# Patient Record
Sex: FEMALE | Race: WHITE | NOT HISPANIC OR LATINO | Employment: FULL TIME | URBAN - METROPOLITAN AREA
[De-identification: names, ages, dates, MRNs, and addresses within clinical notes are randomized per-mention and may not be internally consistent; named-entity substitution may affect disease eponyms.]

---

## 2019-07-02 ENCOUNTER — OFFICE VISIT (OUTPATIENT)
Dept: FAMILY MEDICINE | Facility: CLINIC | Age: 54
End: 2019-07-02
Payer: COMMERCIAL

## 2019-07-02 VITALS
BODY MASS INDEX: 30.46 KG/M2 | OXYGEN SATURATION: 98 % | DIASTOLIC BLOOD PRESSURE: 79 MMHG | WEIGHT: 201 LBS | RESPIRATION RATE: 16 BRPM | HEIGHT: 68 IN | TEMPERATURE: 98 F | SYSTOLIC BLOOD PRESSURE: 117 MMHG | HEART RATE: 77 BPM

## 2019-07-02 DIAGNOSIS — E11.49 DIABETES MELLITUS TYPE 2 WITH NEUROLOGICAL MANIFESTATIONS: ICD-10-CM

## 2019-07-02 DIAGNOSIS — R20.0 NUMBNESS OF TOES: ICD-10-CM

## 2019-07-02 DIAGNOSIS — R09.89 ABSENT PEDAL PULSES: Primary | ICD-10-CM

## 2019-07-02 DIAGNOSIS — E78.5 HYPERLIPIDEMIA, UNSPECIFIED HYPERLIPIDEMIA TYPE: ICD-10-CM

## 2019-07-02 DIAGNOSIS — E11.49 DIABETES MELLITUS TYPE 2 WITH NEUROLOGICAL MANIFESTATIONS: Primary | ICD-10-CM

## 2019-07-02 DIAGNOSIS — K59.09 CHRONIC CONSTIPATION: ICD-10-CM

## 2019-07-02 DIAGNOSIS — Z00.00 ANNUAL PHYSICAL EXAM: ICD-10-CM

## 2019-07-02 DIAGNOSIS — Z12.39 SCREENING FOR MALIGNANT NEOPLASM OF BREAST: ICD-10-CM

## 2019-07-02 PROCEDURE — 99396 PREV VISIT EST AGE 40-64: CPT | Mod: S$GLB,,, | Performed by: NURSE PRACTITIONER

## 2019-07-02 PROCEDURE — 99396 PR PREVENTIVE VISIT,EST,40-64: ICD-10-PCS | Mod: S$GLB,,, | Performed by: NURSE PRACTITIONER

## 2019-07-02 RX ORDER — ATORVASTATIN CALCIUM 40 MG/1
40 TABLET, FILM COATED ORAL DAILY
Qty: 30 TABLET | Refills: 3 | Status: SHIPPED | OUTPATIENT
Start: 2019-07-02 | End: 2019-08-20 | Stop reason: SDUPTHER

## 2019-07-02 RX ORDER — DAPAGLIFLOZIN 5 MG/1
5 TABLET, FILM COATED ORAL DAILY
Qty: 30 TABLET | Refills: 3 | Status: SHIPPED | OUTPATIENT
Start: 2019-07-02 | End: 2019-08-20 | Stop reason: SDUPTHER

## 2019-07-02 NOTE — PROGRESS NOTES
Will establish statin tolerability then increase to 80 mg if tolerates    Notified pt to stay clean and dry vaginally on farxiga - pt verb understanding    Notified pt of all her lab work- she verb u/s    Pt request needleless system to check sugars will send rx for freestyle sadiq    Instructed her to keep log of fasting sugarrs as well as 2 hours after a meal when possible

## 2019-07-02 NOTE — PROGRESS NOTES
Subjective:       Patient ID: Margaret Pate is a 54 y.o. female.    Chief Complaint: Establish Care (Prior PCP Dr Barrios. Fasting this AM. Is DM2 and was on Metformin, but stayed nauseoues. 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 Negative for fever and unexpected weight change.   HENT: Positive for congestion, postnasal drip, rhinorrhea and sneezing.    Eyes: Positive for itching.   Respiratory: Negative for cough, shortness of breath and wheezing.    Cardiovascular: Negative for chest pain, palpitations and leg swelling.   Gastrointestinal: Positive for constipation. Negative for abdominal pain, diarrhea, nausea and vomiting.   Endocrine: Positive for polydipsia. Negative for cold intolerance, heat intolerance, polyphagia and polyuria.   Genitourinary: Negative.    Musculoskeletal: Negative.    Skin: Positive for wound (scabbed lesions to toes).   Allergic/Immunologic: Positive for environmental allergies.   Neurological: Positive for numbness (bilateral feet) and headaches (twice weekly, frontal, relieved with ibuprofen). Negative for dizziness, seizures and syncope.   Psychiatric/Behavioral: Positive for sleep disturbance (4-8 hours, thinks alot, trouble falling asleep).  "Negative for dysphoric mood. The patient is not nervous/anxious.        Objective:      Physical Exam   Constitutional: She is oriented to person, place, and time. She appears well-developed and well-nourished.   HENT:   Head: Normocephalic.   Right Ear: External ear normal.   Left Ear: External ear normal.   Eyes: Right eye exhibits no discharge. Left eye exhibits no discharge.   Neck: Neck supple. No tracheal deviation present. No thyromegaly present.   Cardiovascular: Normal rate, regular rhythm and normal heart sounds.   No murmur heard.  Pulses:       Dorsalis pedis pulses are 2+ on the right side, and 2+ on the left side.        Posterior tibial pulses are 0 on the right side, and 0 on the left side.   Pulmonary/Chest: Effort normal and breath sounds normal. She has no wheezes. She has no rales.   Abdominal: Soft. Bowel sounds are normal. She exhibits mass (left upper quad- states "port from lapband sugery"). She exhibits no distension. There is no tenderness. There is no guarding.   Musculoskeletal: Normal range of motion. She exhibits no edema.   Neurological: She is alert and oriented to person, place, and time.   Skin: Skin is warm and dry. Lesion noted. There is erythema.   Right and left great toe with scab approx 0.5 cm by 0.5 cm, no drainage, erythema around scab   Psychiatric: She has a normal mood and affect. Her behavior is normal. Judgment normal.       Assessment:       No diagnosis found.    Plan:       1. Absent pedal pulses  VAS US Arterial Legs Bilateral    VAS US Arterial Legs Bilateral    declines cardio ref   2. Annual physical exam  CBC auto differential    TSH    Comprehensive metabolic panel    Lipid panel    Hemoglobin A1c    Microalbumin/creatinine urine ratio    CBC auto differential    TSH    Comprehensive metabolic panel    Lipid panel    Hemoglobin A1c   3. Screening for malignant neoplasm of breast  Mammo Digital Screening Bilat    Mammo Digital Screening Bilat   4. Chronic " constipation  linaCLOtide (LINZESS) 145 mcg Cap capsule   5. Diabetes mellitus type 2 with neurological manifestations     6. Numbness of toes      pt declines recommended nerve conduction study test

## 2019-07-03 ENCOUNTER — TELEPHONE (OUTPATIENT)
Dept: FAMILY MEDICINE | Facility: CLINIC | Age: 54
End: 2019-07-03

## 2019-07-03 DIAGNOSIS — E11.49 DIABETES MELLITUS TYPE 2 WITH NEUROLOGICAL MANIFESTATIONS: Primary | ICD-10-CM

## 2019-07-03 RX ORDER — LANCETS
EACH MISCELLANEOUS
Qty: 30 EACH | Refills: 6 | Status: SHIPPED | OUTPATIENT
Start: 2019-07-03 | End: 2023-06-29

## 2019-07-03 RX ORDER — INSULIN PUMP SYRINGE, 3 ML
EACH MISCELLANEOUS
Qty: 1 EACH | Refills: 0 | Status: SHIPPED | OUTPATIENT
Start: 2019-07-03 | End: 2021-07-09

## 2019-07-03 NOTE — TELEPHONE ENCOUNTER
Pt states the flash glucose kit copay is too expensive ( $20 q 2wks) - Wants a regular glucose monitor sent to [facility] and they will run it through her insur.

## 2019-07-08 ENCOUNTER — TELEPHONE (OUTPATIENT)
Dept: FAMILY MEDICINE | Facility: CLINIC | Age: 54
End: 2019-07-08

## 2019-07-08 NOTE — TELEPHONE ENCOUNTER
Since on Januvia, farxiga, atrovastatin having horrible unbearable headaches behind both eyes and mid forehead all day long Tues-SUN - Tried IBU, bc powder, tylenol, essential oil.     Hasn't started Linzess yet.   Read that Januvia can cause headaches. Pls advise.

## 2019-07-08 NOTE — TELEPHONE ENCOUNTER
"Spoke w/ NP BEA Mejia and he stated for pt to d/c Farxiga and Januvia until pt f/u w/ Marlene in ofc. Go to ER if worsens.    Pt returned our call. When notifying pt of what NP BEA Mejia response pt stated " Well I haven't taken my medications today so that may hurt me. I am not happy about this, I've waited on a call back all day. I don't want an appt, but want a call back from Marlene tomorrow"    During initial call I explained to her that "Marlene isn't in today, but will be in Tuesday & Wedn and we will get back with her once I get a response. Stop taking the new meds & go to ER if worsens."  "

## 2019-07-09 ENCOUNTER — TELEPHONE (OUTPATIENT)
Dept: INTERNAL MEDICINE | Facility: CLINIC | Age: 54
End: 2019-07-09

## 2019-07-09 NOTE — TELEPHONE ENCOUNTER
Pt states stopped her 3 new medications- farxiga, januvia, and atorvastatin Sunday night due to a headache behind her eyes, she never picked up linzess due to cost at this time    Pt states no head ache at this time, I told her to start back on farxiga today and stay only on this medication until follow up appt and we can add one medication at a time to ensure tolerability - pt verb u/s

## 2019-07-14 PROBLEM — E03.9 HYPOTHYROIDISM: Status: RESOLVED | Noted: 2019-07-14 | Resolved: 2019-07-14

## 2019-07-14 PROBLEM — E03.9 HYPOTHYROIDISM: Status: ACTIVE | Noted: 2019-07-14

## 2019-07-14 PROBLEM — E78.5 HYPERLIPIDEMIA: Status: ACTIVE | Noted: 2019-07-14

## 2019-07-16 ENCOUNTER — TELEPHONE (OUTPATIENT)
Dept: FAMILY MEDICINE | Facility: CLINIC | Age: 54
End: 2019-07-16

## 2019-07-16 DIAGNOSIS — R07.9 CHEST PAIN OF UNKNOWN ETIOLOGY: Primary | ICD-10-CM

## 2019-07-16 DIAGNOSIS — R09.89 ABSENT PEDAL PULSES: ICD-10-CM

## 2019-07-16 DIAGNOSIS — E78.5 HYPERLIPIDEMIA, UNSPECIFIED HYPERLIPIDEMIA TYPE: ICD-10-CM

## 2019-07-16 DIAGNOSIS — E11.49 DIABETES MELLITUS TYPE 2 WITH NEUROLOGICAL MANIFESTATIONS: ICD-10-CM

## 2019-07-16 NOTE — TELEPHONE ENCOUNTER
"1320 - patient called stating she would like referral to cardiologist for perceived chest pain. She was instructed to go to the emergency room for chest pain by M.A. She declined at the time stating it was not that bad but she insisted she get into Cardiology today.  She was informed that there was no guarantee the cardiology would see her today.      Notified of above by Constance Batxer.     Instructed to call pt back and inform referral sent to Dr. Jon and that we can not get her in any quicker. Advised the need to go to ER regardless of when she gets scheduled with cardiology given her risk factors. Informed to go to ER if chest pain worsens.     Noted in chart from prior NP on 7/2:     "Pt declines nerve conduction test at this time, pt declines amb referral to gastroenterology, pt declines amb ref to cardiology regarding feet pain, no hair growth, and states feet get cold."  "

## 2019-07-16 NOTE — TELEPHONE ENCOUNTER
Pt notified - tried giving Dr Jon's ph #, but pt states she is unable to write it down and will goggle him.

## 2019-07-29 ENCOUNTER — OFFICE VISIT (OUTPATIENT)
Dept: FAMILY MEDICINE | Facility: CLINIC | Age: 54
End: 2019-07-29
Payer: COMMERCIAL

## 2019-07-29 VITALS
WEIGHT: 179 LBS | DIASTOLIC BLOOD PRESSURE: 65 MMHG | HEIGHT: 68 IN | BODY MASS INDEX: 27.13 KG/M2 | RESPIRATION RATE: 18 BRPM | SYSTOLIC BLOOD PRESSURE: 135 MMHG | HEART RATE: 72 BPM | OXYGEN SATURATION: 100 %

## 2019-07-29 DIAGNOSIS — G25.81 RESTLESS LEGS: ICD-10-CM

## 2019-07-29 DIAGNOSIS — D75.1 ERYTHROCYTOSIS: ICD-10-CM

## 2019-07-29 DIAGNOSIS — R79.9 ABNORMAL SERUM TOTAL PROTEIN LEVEL: ICD-10-CM

## 2019-07-29 DIAGNOSIS — E78.5 HYPERLIPIDEMIA, UNSPECIFIED HYPERLIPIDEMIA TYPE: ICD-10-CM

## 2019-07-29 DIAGNOSIS — E11.49 DIABETES MELLITUS TYPE 2 WITH NEUROLOGICAL MANIFESTATIONS: Primary | ICD-10-CM

## 2019-07-29 DIAGNOSIS — G47.30 SLEEP APNEA, UNSPECIFIED TYPE: ICD-10-CM

## 2019-07-29 DIAGNOSIS — E11.42 DIABETIC POLYNEUROPATHY ASSOCIATED WITH TYPE 2 DIABETES MELLITUS: ICD-10-CM

## 2019-07-29 PROCEDURE — 3008F BODY MASS INDEX DOCD: CPT | Mod: CPTII,S$GLB,, | Performed by: NURSE PRACTITIONER

## 2019-07-29 PROCEDURE — 99215 OFFICE O/P EST HI 40 MIN: CPT | Mod: S$GLB,,, | Performed by: NURSE PRACTITIONER

## 2019-07-29 PROCEDURE — 3008F PR BODY MASS INDEX (BMI) DOCUMENTED: ICD-10-PCS | Mod: CPTII,S$GLB,, | Performed by: NURSE PRACTITIONER

## 2019-07-29 PROCEDURE — 99215 PR OFFICE/OUTPT VISIT, EST, LEVL V, 40-54 MIN: ICD-10-PCS | Mod: S$GLB,,, | Performed by: NURSE PRACTITIONER

## 2019-07-29 RX ORDER — GABAPENTIN 300 MG/1
300 CAPSULE ORAL NIGHTLY
Qty: 30 CAPSULE | Refills: 11 | Status: SHIPPED | OUTPATIENT
Start: 2019-07-29 | End: 2019-08-20 | Stop reason: SDUPTHER

## 2019-07-29 NOTE — ASSESSMENT & PLAN NOTE
Continue Farxiga.  Made aware of increased risk UTI and dehydration.      Repeat A1C in 3 mts.  Can tolerate glimepiride and pioglitazone.      Pt well aware of dieting.

## 2019-07-29 NOTE — ASSESSMENT & PLAN NOTE
She will trial Atorvastatin again.  She will notify our office if headaches come back with introduction of medication. Repeat lipid in 6 mts.

## 2019-07-29 NOTE — PROGRESS NOTES
Subjective:      Patient ID: Margaret Pate is a 54 y.o. female.    Chief Complaint: Follow-up (2WK dm2, chest pain, feet pain.)      Establish Care (Prior PCP Dr Barrios. Fasting this AM. Is DM2 and was on Metformin, but stayed nauseoues. 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  During her last encounter, the patient was started on Farxiga and Januvia.  Also was prescribed atorvastatin.  Patient reported headache with the start of all 3 medications.  Patient states she is on Farxiga currently, which controls her blood sugar.  She is checking every morning. Her sugars typically run 115-130s on Farxiga alone. No side effects. She has not tried to get back on Januvia.     Patient states that she was recently seen at an urgent care for atypical chest pain. States her EKG was normal at the time.  She was referred to Dr. Marco Guevara. GXT was normal. She was given samples of Livalo but she is unable to afford the medication. She has Atorvastatin at home, but has not tried to get back on the medication. Denies myalgia while on the med. Tolerating Livalo well, but almost out of the medication. She reports resolution of CP.     She is scheduled for US Arterial  "extremities for "poor circulation."  She has not completed the testing d/t financial issues. Denies claudication. She does report neuropathy of the feet. This is chronic and worse while in bed at night. Denies ulcerations. She does have numbness primarily in the toes. Was offered EMG but denied.       No acute issues reported.       ROS:   Review of Systems   Constitutional: Positive for fatigue. Negative for fever and unexpected weight change.   HENT: Negative for congestion, postnasal drip, rhinorrhea and sneezing.    Eyes: Negative for photophobia, discharge, itching and visual disturbance.   Respiratory: Negative for cough, shortness of breath and wheezing.    Cardiovascular: Negative for chest pain, palpitations and leg swelling.   Gastrointestinal: Positive for constipation. Negative for abdominal pain, diarrhea, nausea and vomiting.   Endocrine: Positive for polydipsia. Negative for cold intolerance, heat intolerance, polyphagia and polyuria.   Genitourinary: Negative.    Musculoskeletal: Negative.    Skin: Positive for wound (scabbed lesions to toes).   Allergic/Immunologic: Positive for environmental allergies.   Neurological: Positive for numbness (bilateral feet). Negative for dizziness, seizures, syncope and headaches (twice weekly, frontal, relieved with ibuprofen).   Psychiatric/Behavioral: Positive for sleep disturbance (4-8 hours, thinks alot, trouble falling asleep). Negative for dysphoric mood. The patient is not nervous/anxious.      Objective:   Physical Exam   Constitutional: She is oriented to person, place, and time. She appears well-developed and well-nourished.   HENT:   Head: Normocephalic.   Right Ear: External ear normal.   Left Ear: External ear normal.   Eyes: Right eye exhibits no discharge. Left eye exhibits no discharge.   Neck: Neck supple. No tracheal deviation present. No thyromegaly present.   Cardiovascular: Normal rate, regular rhythm and normal heart sounds.   No murmur " "heard.  Pulses:       Dorsalis pedis pulses are 2+ on the right side, and 2+ on the left side.        Posterior tibial pulses are 0 on the right side, and 0 on the left side.   Pulmonary/Chest: Effort normal and breath sounds normal. She has no wheezes. She has no rales.   Abdominal: Soft. Bowel sounds are normal. She exhibits mass (left upper quad- states "port from lapband sugery"). She exhibits no distension. There is no tenderness. There is no guarding.   Musculoskeletal: Normal range of motion. She exhibits no edema.   Neurological: She is alert and oriented to person, place, and time.   Skin: Skin is warm and dry. Lesion noted. There is erythema.   Right and left great toe with scab approx 0.5 cm by 0.5 cm, no drainage, erythema around scab.  Cap refill <2. Nonpainful.    Psychiatric: She has a normal mood and affect. Her speech is normal and behavior is normal. Judgment and thought content normal. Cognition and memory are normal.     Assessment:     1. Diabetes mellitus type 2 with neurological manifestations    2. Hyperlipidemia, unspecified hyperlipidemia type    3. Sleep apnea, unspecified type    4. Erythrocytosis    5. Diabetic polyneuropathy associated with type 2 diabetes mellitus    6. Restless legs    7. Abnormal serum total protein level      Plan:     Problem List Items Addressed This Visit        Neuro    Restless legs    Current Assessment & Plan     Check Mg         Relevant Medications    gabapentin (NEURONTIN) 300 MG capsule    Other Relevant Orders    Magnesium    CBC auto differential    Comprehensive metabolic panel    Hemoglobin A1c    Protein electrophoresis, serum    Microalbumin/creatinine urine ratio    Erythropoietin    Diabetic polyneuropathy associated with type 2 diabetes mellitus    Current Assessment & Plan     Trial Gabapentin at night.          Relevant Medications    gabapentin (NEURONTIN) 300 MG capsule    Other Relevant Orders    Magnesium    CBC auto differential    " Comprehensive metabolic panel    Hemoglobin A1c    Protein electrophoresis, serum    Microalbumin/creatinine urine ratio    Erythropoietin       Cardiac/Vascular    Hyperlipidemia    Overview     Unable to tolerate Atorvastin 2/t headache.      Bile acid sequestrant not an option 2/t constipation.         Current Assessment & Plan     She will trial Atorvastatin again.  She will notify our office if headaches come back with introduction of medication. Repeat lipid in 6 mts.          Relevant Medications    gabapentin (NEURONTIN) 300 MG capsule    Other Relevant Orders    Magnesium    CBC auto differential    Comprehensive metabolic panel    Hemoglobin A1c    Protein electrophoresis, serum    Microalbumin/creatinine urine ratio    Erythropoietin       Oncology    Erythrocytosis    Overview     2/t IRVIN.          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          Current Assessment & Plan     Continue Farxiga.  Made aware of increased risk UTI and dehydration.      Repeat A1C in 3 mts.  Can tolerate glimepiride and pioglitazone.      Pt well aware of dieting.          Relevant Medications    gabapentin (NEURONTIN) 300 MG capsule    Other Relevant Orders    Magnesium    CBC auto differential    Comprehensive metabolic panel    Hemoglobin A1c    Protein electrophoresis, serum    Microalbumin/creatinine urine ratio    Erythropoietin    Protein electrophoresis, serum       Other    Sleep apnea    Overview     Erythrocytosis noted.          Relevant Medications    gabapentin (NEURONTIN) 300 MG capsule    Other Relevant Orders     Magnesium    CBC auto differential    Comprehensive metabolic panel    Hemoglobin A1c    Protein electrophoresis, serum    Microalbumin/creatinine urine ratio    Erythropoietin    Abnormal serum total protein level    Overview     Will draw SPEP. Order HIV/Hep panel at next visit if TP comes back elevated again.          Relevant Medications    gabapentin (NEURONTIN) 300 MG capsule    Other Relevant Orders    Magnesium    CBC auto differential    Comprehensive metabolic panel    Hemoglobin A1c    Protein electrophoresis, serum    Microalbumin/creatinine urine ratio    Erythropoietin    Protein electrophoresis, serum        Labs discussed thoroughly with patient.  All questions were answered to her liking.  Suggested to the patient that she complete the ultrasound arterial extremity as ordered.

## 2019-08-20 ENCOUNTER — TELEPHONE (OUTPATIENT)
Dept: FAMILY MEDICINE | Facility: CLINIC | Age: 54
End: 2019-08-20

## 2019-08-20 DIAGNOSIS — K59.09 CHRONIC CONSTIPATION: ICD-10-CM

## 2019-08-20 DIAGNOSIS — E11.49 DIABETES MELLITUS TYPE 2 WITH NEUROLOGICAL MANIFESTATIONS: ICD-10-CM

## 2019-08-20 DIAGNOSIS — D75.1 ERYTHROCYTOSIS: ICD-10-CM

## 2019-08-20 DIAGNOSIS — G47.30 SLEEP APNEA, UNSPECIFIED TYPE: ICD-10-CM

## 2019-08-20 DIAGNOSIS — E11.42 DIABETIC POLYNEUROPATHY ASSOCIATED WITH TYPE 2 DIABETES MELLITUS: ICD-10-CM

## 2019-08-20 DIAGNOSIS — G25.81 RESTLESS LEGS: ICD-10-CM

## 2019-08-20 DIAGNOSIS — E78.5 HYPERLIPIDEMIA, UNSPECIFIED HYPERLIPIDEMIA TYPE: ICD-10-CM

## 2019-08-20 DIAGNOSIS — R79.9 ABNORMAL SERUM TOTAL PROTEIN LEVEL: ICD-10-CM

## 2019-08-20 RX ORDER — DAPAGLIFLOZIN 5 MG/1
5 TABLET, FILM COATED ORAL DAILY
Qty: 90 TABLET | Refills: 2 | Status: SHIPPED | OUTPATIENT
Start: 2019-08-20 | End: 2020-01-24

## 2019-08-20 RX ORDER — GABAPENTIN 300 MG/1
300 CAPSULE ORAL NIGHTLY
Qty: 90 CAPSULE | Refills: 2 | Status: SHIPPED | OUTPATIENT
Start: 2019-08-20 | End: 2020-01-24 | Stop reason: SDUPTHER

## 2019-08-20 RX ORDER — ATORVASTATIN CALCIUM 40 MG/1
40 TABLET, FILM COATED ORAL DAILY
Qty: 90 TABLET | Refills: 1 | Status: SHIPPED | OUTPATIENT
Start: 2019-08-20 | End: 2020-01-24 | Stop reason: SDUPTHER

## 2019-08-20 NOTE — TELEPHONE ENCOUNTER
----- Message from Marisol Rome sent at 8/20/2019  9:22 AM CDT -----  Contact: self  Pt called to get medication called out to [facility] to receive discount. Pt said she will need a 3 month supply of :         atorvastatin (LIPITOR) 40 MG tablet(Take 1 tablet (40 mg total) by mouth once daily. - Oral)        dapagliflozin (FARXIGA) 5 mg Tab tablet (Take 1 tablet (5 mg total) by mouth once daily. -     Oral)        abapentin (NEURONTIN) 300 MG capsule( Take 1 capsule (300 mg total) by mouth every evening. - Oral),     linaCLOtide (LINZESS) 145 mcg Cap capsule(Take 1 capsule (145 mcg total) by mouth once daily. - Oral)

## 2019-09-04 ENCOUNTER — TELEPHONE (OUTPATIENT)
Dept: FAMILY MEDICINE | Facility: CLINIC | Age: 54
End: 2019-09-04

## 2019-09-04 NOTE — TELEPHONE ENCOUNTER
Pt states she would like to d/c Farxiga and start Actos. Has taken Actos in the past and is now unable to afford high Farxiga copay.

## 2019-09-05 RX ORDER — PIOGLITAZONEHYDROCHLORIDE 30 MG/1
30 TABLET ORAL DAILY
Qty: 90 TABLET | Refills: 3 | Status: SHIPPED | OUTPATIENT
Start: 2019-09-05 | End: 2019-10-18 | Stop reason: SDUPTHER

## 2019-10-18 ENCOUNTER — TELEPHONE (OUTPATIENT)
Dept: FAMILY MEDICINE | Facility: CLINIC | Age: 54
End: 2019-10-18

## 2019-10-18 RX ORDER — PIOGLITAZONEHYDROCHLORIDE 30 MG/1
30 TABLET ORAL DAILY
Qty: 90 TABLET | Refills: 3 | Status: SHIPPED | OUTPATIENT
Start: 2019-10-18 | End: 2020-01-24 | Stop reason: SDUPTHER

## 2019-10-25 ENCOUNTER — CLINICAL SUPPORT (OUTPATIENT)
Dept: FAMILY MEDICINE | Facility: CLINIC | Age: 54
End: 2019-10-25
Payer: COMMERCIAL

## 2020-01-24 ENCOUNTER — OFFICE VISIT (OUTPATIENT)
Dept: FAMILY MEDICINE | Facility: CLINIC | Age: 55
End: 2020-01-24
Payer: COMMERCIAL

## 2020-01-24 VITALS
DIASTOLIC BLOOD PRESSURE: 80 MMHG | WEIGHT: 192 LBS | RESPIRATION RATE: 16 BRPM | OXYGEN SATURATION: 98 % | SYSTOLIC BLOOD PRESSURE: 132 MMHG | HEART RATE: 82 BPM | BODY MASS INDEX: 29.1 KG/M2 | HEIGHT: 68 IN

## 2020-01-24 DIAGNOSIS — E11.49 DIABETES MELLITUS TYPE 2 WITH NEUROLOGICAL MANIFESTATIONS: ICD-10-CM

## 2020-01-24 DIAGNOSIS — R79.9 ABNORMAL SERUM TOTAL PROTEIN LEVEL: ICD-10-CM

## 2020-01-24 DIAGNOSIS — D75.1 ERYTHROCYTOSIS: ICD-10-CM

## 2020-01-24 DIAGNOSIS — Z09 HOSPITAL DISCHARGE FOLLOW-UP: Primary | ICD-10-CM

## 2020-01-24 DIAGNOSIS — G25.81 RESTLESS LEGS: ICD-10-CM

## 2020-01-24 DIAGNOSIS — E78.5 HYPERLIPIDEMIA, UNSPECIFIED HYPERLIPIDEMIA TYPE: ICD-10-CM

## 2020-01-24 DIAGNOSIS — G47.30 SLEEP APNEA, UNSPECIFIED TYPE: ICD-10-CM

## 2020-01-24 DIAGNOSIS — B37.49 CANDIDAL URINARY TRACT INFECTION: ICD-10-CM

## 2020-01-24 DIAGNOSIS — E11.42 DIABETIC POLYNEUROPATHY ASSOCIATED WITH TYPE 2 DIABETES MELLITUS: ICD-10-CM

## 2020-01-24 DIAGNOSIS — N39.0 URINARY TRACT INFECTION WITHOUT HEMATURIA, SITE UNSPECIFIED: ICD-10-CM

## 2020-01-24 PROCEDURE — 99215 PR OFFICE/OUTPT VISIT, EST, LEVL V, 40-54 MIN: ICD-10-PCS | Mod: S$GLB,,, | Performed by: NURSE PRACTITIONER

## 2020-01-24 PROCEDURE — 99215 OFFICE O/P EST HI 40 MIN: CPT | Mod: S$GLB,,, | Performed by: NURSE PRACTITIONER

## 2020-01-24 PROCEDURE — 3008F BODY MASS INDEX DOCD: CPT | Mod: CPTII,S$GLB,, | Performed by: NURSE PRACTITIONER

## 2020-01-24 PROCEDURE — 3008F PR BODY MASS INDEX (BMI) DOCUMENTED: ICD-10-PCS | Mod: CPTII,S$GLB,, | Performed by: NURSE PRACTITIONER

## 2020-01-24 RX ORDER — GABAPENTIN 300 MG/1
300 CAPSULE ORAL NIGHTLY
Qty: 90 CAPSULE | Refills: 2 | Status: SHIPPED | OUTPATIENT
Start: 2020-01-24 | End: 2020-10-14 | Stop reason: SDUPTHER

## 2020-01-24 RX ORDER — ATORVASTATIN CALCIUM 40 MG/1
40 TABLET, FILM COATED ORAL DAILY
Qty: 90 TABLET | Refills: 1 | Status: SHIPPED | OUTPATIENT
Start: 2020-01-24 | End: 2020-10-14 | Stop reason: SDUPTHER

## 2020-01-24 RX ORDER — POLYETHYLENE GLYCOL 3350 17 G/17G
17 POWDER, FOR SOLUTION ORAL DAILY
Qty: 100 EACH | Refills: 2 | Status: SHIPPED | OUTPATIENT
Start: 2020-01-24 | End: 2021-08-10

## 2020-01-24 RX ORDER — NITROFURANTOIN 25; 75 MG/1; MG/1
CAPSULE ORAL
COMMUNITY
Start: 2020-01-21 | End: 2021-07-09

## 2020-01-24 RX ORDER — GLIMEPIRIDE 2 MG/1
2 TABLET ORAL 2 TIMES DAILY WITH MEALS
Qty: 180 TABLET | Refills: 3 | Status: SHIPPED | OUTPATIENT
Start: 2020-01-24 | End: 2020-01-31 | Stop reason: SDUPTHER

## 2020-01-24 RX ORDER — PIOGLITAZONEHYDROCHLORIDE 30 MG/1
30 TABLET ORAL DAILY
Qty: 90 TABLET | Refills: 3 | Status: SHIPPED | OUTPATIENT
Start: 2020-01-24 | End: 2020-02-26

## 2020-01-24 NOTE — ASSESSMENT & PLAN NOTE
Patient currently on Actos 30 mg.     will start glimepiride 2 mg b.i.d.   She will get A1c on Monday morning  And follow up on Thursday or Friday to discuss results.      patient states she was unable to tolerate Januvia and Farxiga.    another option would be Jardiance.   
 Total protein 8.9  Per ER records.      order SPEP.        will determine further action once the results come back.  Likely will order HIV/ hepatitis panel  
 Will Rx atorvastatin.  Doubtful that and her headache was secondary to this medication.   
Plan to order EPO.   
100% of the time

## 2020-01-24 NOTE — PATIENT INSTRUCTIONS
Healthy Meals for Diabetes     A healthcare provider will help you develop a meal plan that fits your needs.   Ask your healthcare team to help you make a meal plan that fits your needs. Your meal plan tells you when to eat your meals and snacks, what kinds of foods to eat, and how much of each food to eat. You dont have to give up all the foods you like. But you do need to follow some guidelines.  Choose healthy carbohydrates  Starches, sugars, and fiber are all types of carbohydrates. Fiber can help lower your cholesterol and triglycerides. Fiber is also healthy for your heart. You should have 20 to 35 grams of total fiber each day. Fiber-rich foods include:  · Whole-grain breads and cereals  · Bulgur wheat  · Brown rice     · Whole-wheat pasta  · Fruits and vegetables  · Dry beans, and peas   Keep track of the amount of carbohydrates you eat. This can help you keep the right balance of physical activity and medicine. The amount of carbohydrates needed will vary for each person. It depends on many things such as your health, the medicines you take, and how active you are. Your healthcare team will help you figure out the right amount of carbohydrates for you. You may start with around 45 to 60 grams of carbohydrates per meal, depending on your situation.   Here are some examples of foods containing about 15 grams of carbohydrates (1 serving of carbohydrates):  · 1/2 cup of canned or frozen fruit  · A small piece of fresh fruit (4 ounces)  · 1 slice of bread  · 1/2 cup of oatmeal  · 1/3 cup of rice  · 4 to 6 crackers  · 1/2 English muffin  · 1/2 cup of black beans  · 1/4 of a large baked potato (3 ounces)  · 2/3 cup of plain fat-free yogurt  · 1 cup of soup  · 1/2 cup of casserole  · 6 chicken nuggets  · 2-inch-square brownie or cake without frosting  · 2 small cookies  · 1/2 cup of ice cream or sherbet  Choose healthy protein foods  Eating protein that is low in fat can help you control your weight. It also  helps keep your heart healthy. Low-fat protein foods include:  · Fish  · Plant proteins, such as dry beans and peas, nuts, and soy products like tofu and soymilk  · Lean meat with all visible fat removed  · Poultry with the skin removed  · Low-fat or nonfat milk, cheese, and yogurt  Limit unhealthy fats and sugar  Saturated and trans fats are unhealthy for your heart. They raise LDL (bad) cholesterol. Fat is also high in calories, so it can make you gain weight. To cut down on unhealthy fats and sugar, limit these foods:  · Butter or margarine  · Palm and palm kernel oils and coconut oil  · Cream  · Cheese  · Cottrell  · Lunch meats     · Ice cream  · Sweet bakery goods such as pies, muffins, and donuts  · Jams and jellies  · Candy bars  · Regular sodas   How much to eat  The amount of food you eat affects your blood sugar. It also affects your weight. Your healthcare team will tell you how much of each type of food you should eat.  · Use measuring cups and spoons and a food scale to measure serving sizes.  · Learn what a correct serving size looks like on your plate. This will help when you are away from home and cant measure your servings.  · Eat only the number of servings given on your meal plan for each food. Dont take seconds.  · Learn to read food labels. Be sure to look at serving size, total carbohydrates, fiber, calories, sugar, and saturated and trans fats. Look for healthier alternatives to foods that have added sugar.  · Plan ahead for parties so you can still have a good time without going overboard with unhealthy food choices. Set a good example yourself by bringing a healthy dish to pot lucks.   Choose healthy snacks  When it comes to snacks, we usually think about foods with added sugar and fats. But there are many other options for healthier snack choices. Here are a few snack ideas to choose from:  Snacks with less than 5 grams of carbohydrates  · 1 piece of string cheese  · 3 celery sticks plus 1  tablespoon of peanut butter  · 5 cherry tomatoes plus 1 tablespoon of ranch dressing  · 1 hard-boiled egg  · 1/4 cup of fresh blueberries  ·  5 baby carrots  · 1 cup of light popcorn  · 1/2 cup of sugar-free gelatin  · 15 almonds  Snacks with about 10 to 20 grams of carbohydrates  · 1/3 cup of hummus plus 1 cup of fresh cut nonstarchy vegetables (carrots, green peppers, broccoli, celery, or a combination)  · 1/2 cup of fresh or canned fruit plus 1/4 cup of cottage cheese  · 1/2 cup of tuna salad with 4 crackers  · 2 rice cakes and a tablespoon of peanut butter  · 1 small apple or orange  · 3 cups light popcorn  · 1/2 of a turkey sandwich (1 slice of whole-wheat bread, 2 ounces of turkey, and mustard)  Portion sizes are important to controlling your blood sugar and staying at a healthy weight. Stock up on healthy snack items so you always have them on hand.  When to eat  Your meal plan will likely include breakfast, lunch, dinner, and some snacks.  · Try to eat your meals and snacks at about the same times each day.  · Eat all your meals and snacks. Skipping a meal or snack can make your blood sugar drop too low. It can also cause you to eat too much at the next meal or snack. Then your blood sugar could get too high.  Date Last Reviewed: 7/1/2016  © 1649-2242 [employer]. [address] [place] [address], [place] [address]. All rights reserved. This information is not intended as a substitute for professional medical care. Always follow your healthcare professional's instructions.

## 2020-01-24 NOTE — PROGRESS NOTES
Subjective:      Patient ID: Margaret Pate is a 54 y.o. female.    Chief Complaint: Follow-up (Avail f/u for Hyperglycemia. Developed fever on Sunday before going in. Went to u/c tues and was neg for FLU, but had UTI and B/S was 400. )      54-year-old female  With a past medical history of uncontrolled type 2 diabetes  Here today for follow-up recent emergency room visit.  Patient states that she went to [facility] hospital  For fever and was diagnosed with a urinary tract infection and hyperglycemia.  Patient has a history of noncompliance..   States that she started back on her Actos 30 mg after being discharged.  She was prescribed glimepiride in the past as well as Januvia and farxiga.  She took herself off of the Januvia and  Farxiga stating 1 of them gave her headache.  She is also unable to take metformin due to GI upset.  She is requesting to get back on glimepiride.  She was unable to tolerate metformin in the past.  In regards to her emergency room visit, she was prescribed Macrobid.  She denies dysuria, hematuria, fever, suprapubic pain, flank pain.  She reports resolution of the symptoms she initially presented to the emergency room for.  She has labs in hand as well as emergency room record for your review.  She voices no acute issues at this time.     Past Medical History:   Diagnosis Date    DM (diabetes mellitus)     TYPE 2    Elevated blood-pressure reading without diagnosis of hypertension     Hypothyroidism     Perimenopausal disorder       Social History     Socioeconomic History    Marital status:      Spouse name: Not on file    Number of children: Not on file    Years of education: Not on file    Highest education level: Not on file   Occupational History    Not on file   Social Needs    Financial resource strain: Not on file    Food insecurity:     Worry: Not on file     Inability: Not on file    Transportation needs:     Medical: Not on file     Non-medical: Not on file   Tobacco  Use    Smoking status: Former Smoker    Smokeless tobacco: Never Used   Substance and Sexual Activity    Alcohol use: Never     Frequency: Never    Drug use: Never    Sexual activity: Not on file   Lifestyle    Physical activity:     Days per week: Not on file     Minutes per session: Not on file    Stress: Not on file   Relationships    Social connections:     Talks on phone: Not on file     Gets together: Not on file     Attends Protestant service: Not on file     Active member of club or organization: Not on file     Attends meetings of clubs or organizations: Not on file     Relationship status: Not on file   Other Topics Concern    Not on file   Social History Narrative    Not on file      Family History   Problem Relation Age of Onset    Diabetes Mother     Hypertension Mother     Diabetes Father     Colon cancer Father     Stroke Sister         ROS:   Review of Systems   Constitutional: Negative for chills, diaphoresis, fatigue, fever and unexpected weight change.   HENT: Negative for congestion, postnasal drip, rhinorrhea and sneezing.    Eyes: Negative for photophobia, discharge, itching and visual disturbance.   Respiratory: Negative for cough, shortness of breath and wheezing.    Cardiovascular: Negative for chest pain, palpitations and leg swelling.   Gastrointestinal: Positive for constipation. Negative for abdominal pain, diarrhea, nausea and vomiting.   Endocrine: Positive for polydipsia. Negative for cold intolerance, heat intolerance, polyphagia and polyuria.   Genitourinary: Negative.    Musculoskeletal: Negative.    Skin: Negative for wound.   Allergic/Immunologic: Positive for environmental allergies.   Neurological: Positive for numbness (bilateral feet). Negative for dizziness, seizures, syncope and headaches (twice weekly, frontal, relieved with ibuprofen).   Hematological: Negative.    Psychiatric/Behavioral: Positive for sleep disturbance (4-8 hours, thinks alot, trouble  "falling asleep). Negative for dysphoric mood. The patient is not nervous/anxious.    All other systems reviewed and are negative.    Objective:   Physical Exam   Constitutional: She is oriented to person, place, and time. She appears well-developed and well-nourished.   HENT:   Head: Normocephalic.   Right Ear: External ear normal.   Left Ear: External ear normal.   Nose: Nose normal.   Mouth/Throat: Oropharynx is clear and moist.   Eyes: Pupils are equal, round, and reactive to light. Right eye exhibits no discharge. Left eye exhibits no discharge.   Neck: Neck supple. No tracheal deviation present. No thyromegaly present.   Cardiovascular: Normal rate, regular rhythm and normal heart sounds.   No murmur heard.  Pulses:       Dorsalis pedis pulses are 2+ on the right side, and 2+ on the left side.        Posterior tibial pulses are 0 on the right side, and 0 on the left side.   Pulmonary/Chest: Effort normal and breath sounds normal. She has no wheezes. She has no rales.   Abdominal: Soft. Bowel sounds are normal. She exhibits mass (left upper quad- states "port from lapband sugery"). She exhibits no distension. There is no tenderness. There is no guarding.   Musculoskeletal: Normal range of motion. She exhibits no edema.   Neurological: She is alert and oriented to person, place, and time.   Skin: Skin is warm and dry. No lesion noted. No erythema.   Psychiatric: She has a normal mood and affect. Her speech is normal and behavior is normal. Judgment and thought content normal. Cognition and memory are normal.   Nursing note and vitals reviewed.    Assessment:     1. Hospital discharge follow-up    2. Diabetes mellitus type 2 with neurological manifestations    3. Hyperlipidemia, unspecified hyperlipidemia type    4. Sleep apnea, unspecified type    5. Erythrocytosis    6. Diabetic polyneuropathy associated with type 2 diabetes mellitus    7. Restless legs    8. Abnormal serum total protein level    9. Candidal " urinary tract infection    10. Urinary tract infection without hematuria, site unspecified      Plan:     Problem List Items Addressed This Visit        Neuro    Restless legs    Relevant Medications    gabapentin (NEURONTIN) 300 MG capsule    Diabetic polyneuropathy associated with type 2 diabetes mellitus    Relevant Medications    glimepiride (AMARYL) 2 MG tablet    gabapentin (NEURONTIN) 300 MG capsule    pioglitazone (ACTOS) 30 MG tablet       Cardiac/Vascular    Hyperlipidemia    Overview     Unable to tolerate Atorvastin 2/t headache.      Bile acid sequestrant not an option 2/t constipation.         Current Assessment & Plan      Will Rx atorvastatin.  Doubtful that and her headache was secondary to this medication.          Relevant Medications    atorvastatin (LIPITOR) 40 MG tablet    gabapentin (NEURONTIN) 300 MG capsule       Oncology    Erythrocytosis    Overview     2/t IRVIN.          Relevant Medications    gabapentin (NEURONTIN) 300 MG capsule       Endocrine    Diabetes mellitus type 2 with neurological manifestations    Overview     Phobia of needles which limits use of accuchecks and insulin/injectables.          Current Assessment & Plan      Patient currently on Actos 30 mg.     will start glimepiride 2 mg b.i.d.   She will get A1c on Monday morning  And follow up on Thursday or Friday to discuss results.      patient states she was unable to tolerate Januvia and Farxiga.    another option would be Jardiance.          Relevant Medications    glimepiride (AMARYL) 2 MG tablet    gabapentin (NEURONTIN) 300 MG capsule    pioglitazone (ACTOS) 30 MG tablet    Other Relevant Orders    CBC auto differential    Hemoglobin A1c    Comprehensive metabolic panel    Lipid panel    Protein electrophoresis, serum    Urinalysis, Reflex to Urine Culture Urine, Clean Catch    TSH+Free T4    Microalbumin/creatinine urine ratio       Other    Sleep apnea    Overview     Erythrocytosis noted.          Relevant  Medications    gabapentin (NEURONTIN) 300 MG capsule    Abnormal serum total protein level    Overview     Will draw SPEP. Order HIV/Hep panel at next visit if TP comes back elevated again.          Current Assessment & Plan      Total protein 8.9  Per ER records.      order SPEP.        will determine further action once the results come back.  Likely will order HIV/ hepatitis panel         Relevant Medications    gabapentin (NEURONTIN) 300 MG capsule      Other Visit Diagnoses     Hospital discharge follow-up    -  Primary    Candidal urinary tract infection        1+ yeast in urine per ER records. Currently on Macrobid. Will repeat UA on Monday for confirmation    Urinary tract infection without hematuria, site unspecified        Continue Macrobid. repeat UA for clearance.          medical records reviewed as well as labs.  Prior encounters reviewed.  Diabetic diet printed for the patient.     Follow up:     Patient Instructions       Healthy Meals for Diabetes     A healthcare provider will help you develop a meal plan that fits your needs.   Ask your healthcare team to help you make a meal plan that fits your needs. Your meal plan tells you when to eat your meals and snacks, what kinds of foods to eat, and how much of each food to eat. You dont have to give up all the foods you like. But you do need to follow some guidelines.  Choose healthy carbohydrates  Starches, sugars, and fiber are all types of carbohydrates. Fiber can help lower your cholesterol and triglycerides. Fiber is also healthy for your heart. You should have 20 to 35 grams of total fiber each day. Fiber-rich foods include:  · Whole-grain breads and cereals  · Bulgur wheat  · Brown rice     · Whole-wheat pasta  · Fruits and vegetables  · Dry beans, and peas   Keep track of the amount of carbohydrates you eat. This can help you keep the right balance of physical activity and medicine. The amount of carbohydrates needed will vary for each person.  It depends on many things such as your health, the medicines you take, and how active you are. Your healthcare team will help you figure out the right amount of carbohydrates for you. You may start with around 45 to 60 grams of carbohydrates per meal, depending on your situation.   Here are some examples of foods containing about 15 grams of carbohydrates (1 serving of carbohydrates):  · 1/2 cup of canned or frozen fruit  · A small piece of fresh fruit (4 ounces)  · 1 slice of bread  · 1/2 cup of oatmeal  · 1/3 cup of rice  · 4 to 6 crackers  · 1/2 English muffin  · 1/2 cup of black beans  · 1/4 of a large baked potato (3 ounces)  · 2/3 cup of plain fat-free yogurt  · 1 cup of soup  · 1/2 cup of casserole  · 6 chicken nuggets  · 2-inch-square brownie or cake without frosting  · 2 small cookies  · 1/2 cup of ice cream or sherbet  Choose healthy protein foods  Eating protein that is low in fat can help you control your weight. It also helps keep your heart healthy. Low-fat protein foods include:  · Fish  · Plant proteins, such as dry beans and peas, nuts, and soy products like tofu and soymilk  · Lean meat with all visible fat removed  · Poultry with the skin removed  · Low-fat or nonfat milk, cheese, and yogurt  Limit unhealthy fats and sugar  Saturated and trans fats are unhealthy for your heart. They raise LDL (bad) cholesterol. Fat is also high in calories, so it can make you gain weight. To cut down on unhealthy fats and sugar, limit these foods:  · Butter or margarine  · Palm and palm kernel oils and coconut oil  · Cream  · Cheese  · Cottrell  · Lunch meats     · Ice cream  · Sweet bakery goods such as pies, muffins, and donuts  · Jams and jellies  · Candy bars  · Regular sodas   How much to eat  The amount of food you eat affects your blood sugar. It also affects your weight. Your healthcare team will tell you how much of each type of food you should eat.  · Use measuring cups and spoons and a food scale to  measure serving sizes.  · Learn what a correct serving size looks like on your plate. This will help when you are away from home and cant measure your servings.  · Eat only the number of servings given on your meal plan for each food. Dont take seconds.  · Learn to read food labels. Be sure to look at serving size, total carbohydrates, fiber, calories, sugar, and saturated and trans fats. Look for healthier alternatives to foods that have added sugar.  · Plan ahead for parties so you can still have a good time without going overboard with unhealthy food choices. Set a good example yourself by bringing a healthy dish to pot lucks.   Choose healthy snacks  When it comes to snacks, we usually think about foods with added sugar and fats. But there are many other options for healthier snack choices. Here are a few snack ideas to choose from:  Snacks with less than 5 grams of carbohydrates  · 1 piece of string cheese  · 3 celery sticks plus 1 tablespoon of peanut butter  · 5 cherry tomatoes plus 1 tablespoon of ranch dressing  · 1 hard-boiled egg  · 1/4 cup of fresh blueberries  ·  5 baby carrots  · 1 cup of light popcorn  · 1/2 cup of sugar-free gelatin  · 15 almonds  Snacks with about 10 to 20 grams of carbohydrates  · 1/3 cup of hummus plus 1 cup of fresh cut nonstarchy vegetables (carrots, green peppers, broccoli, celery, or a combination)  · 1/2 cup of fresh or canned fruit plus 1/4 cup of cottage cheese  · 1/2 cup of tuna salad with 4 crackers  · 2 rice cakes and a tablespoon of peanut butter  · 1 small apple or orange  · 3 cups light popcorn  · 1/2 of a turkey sandwich (1 slice of whole-wheat bread, 2 ounces of turkey, and mustard)  Portion sizes are important to controlling your blood sugar and staying at a healthy weight. Stock up on healthy snack items so you always have them on hand.  When to eat  Your meal plan will likely include breakfast, lunch, dinner, and some snacks.  · Try to eat your meals and snacks  at about the same times each day.  · Eat all your meals and snacks. Skipping a meal or snack can make your blood sugar drop too low. It can also cause you to eat too much at the next meal or snack. Then your blood sugar could get too high.  Date Last Reviewed: 7/1/2016  © 0240-9315 [employer]. [address] [place] [address], [place] [address]. All rights reserved. This information is not intended as a substitute for professional medical care. Always follow your healthcare professional's instructions.             Follow up in about 1 week (around 1/31/2020), or if symptoms worsen or fail to improve.

## 2020-01-28 ENCOUNTER — TELEPHONE (OUTPATIENT)
Dept: FAMILY MEDICINE | Facility: CLINIC | Age: 55
End: 2020-01-28

## 2020-01-28 DIAGNOSIS — E11.49 DIABETES MELLITUS TYPE 2 WITH NEUROLOGICAL MANIFESTATIONS: Primary | ICD-10-CM

## 2020-01-28 LAB
CHOLEST SERPL-MSCNC: 244 MG/DL (ref 0–200)
CREATININE URINE: 73.9 (ref 22–328)
HBA1C MFR BLD: 11.3 %
HDL/CHOLESTEROL RATIO: 8.5
HDLC SERPL-MCNC: 29 MG/DL (ref 40–60)
LDLC SERPL CALC-MCNC: 173 MG/DL
MICROALBUMIN URINE RANDOM: 1.3
MICROALBUMIN/CREATININE RATIO: 18
TRIGL SERPL-MCNC: 213 MG/DL
VLDL CHOLESTEROL: 42.6 MG/DL (ref 5–40)

## 2020-01-28 NOTE — TELEPHONE ENCOUNTER
Faxing referral to [facility] center.    Tried calling pt to f/u on when she will complete labs. No VM box.

## 2020-01-30 NOTE — TELEPHONE ENCOUNTER
Urinalysis showing 4+ glucose, 1+ ketones, positive budding yeast - treat with fluconazole oral x 1 week w/ repeat UA       Hemoglobin A1c 11.3    Hyponatremia likely secondary to peripheral edema    Lipid profile  Total cholesterol 244, triglycerides 213, HDL 28,  - recommend maximum dose of atorvastatin with repeat lipid in 1 month.     CBC unremarkable  TSH, free T4 WNL    Will discuss the above at her follow-up visit

## 2020-01-31 ENCOUNTER — OFFICE VISIT (OUTPATIENT)
Dept: FAMILY MEDICINE | Facility: CLINIC | Age: 55
End: 2020-01-31
Payer: COMMERCIAL

## 2020-01-31 VITALS
HEART RATE: 78 BPM | RESPIRATION RATE: 18 BRPM | SYSTOLIC BLOOD PRESSURE: 110 MMHG | DIASTOLIC BLOOD PRESSURE: 60 MMHG | BODY MASS INDEX: 29.1 KG/M2 | WEIGHT: 192 LBS | OXYGEN SATURATION: 98 % | HEIGHT: 68 IN

## 2020-01-31 DIAGNOSIS — E11.49 DIABETES MELLITUS TYPE 2 WITH NEUROLOGICAL MANIFESTATIONS: Primary | ICD-10-CM

## 2020-01-31 DIAGNOSIS — B37.49 CANDIDAL URINARY TRACT INFECTION: ICD-10-CM

## 2020-01-31 DIAGNOSIS — E78.5 HYPERLIPIDEMIA, UNSPECIFIED HYPERLIPIDEMIA TYPE: ICD-10-CM

## 2020-01-31 PROCEDURE — 99214 PR OFFICE/OUTPT VISIT, EST, LEVL IV, 30-39 MIN: ICD-10-PCS | Mod: S$GLB,,, | Performed by: NURSE PRACTITIONER

## 2020-01-31 PROCEDURE — 3008F BODY MASS INDEX DOCD: CPT | Mod: CPTII,S$GLB,, | Performed by: NURSE PRACTITIONER

## 2020-01-31 PROCEDURE — 3008F PR BODY MASS INDEX (BMI) DOCUMENTED: ICD-10-PCS | Mod: CPTII,S$GLB,, | Performed by: NURSE PRACTITIONER

## 2020-01-31 PROCEDURE — 99214 OFFICE O/P EST MOD 30 MIN: CPT | Mod: S$GLB,,, | Performed by: NURSE PRACTITIONER

## 2020-01-31 RX ORDER — FLUCONAZOLE 100 MG/1
TABLET ORAL
Qty: 8 TABLET | Refills: 0 | Status: SHIPPED | OUTPATIENT
Start: 2020-01-31 | End: 2021-07-09

## 2020-01-31 RX ORDER — GLIMEPIRIDE 2 MG/1
4 TABLET ORAL 2 TIMES DAILY WITH MEALS
Qty: 360 TABLET | Refills: 3
Start: 2020-01-31 | End: 2020-02-07 | Stop reason: SDUPTHER

## 2020-01-31 NOTE — PROGRESS NOTES
Subjective:      Patient ID: Margaret Pate is a 54 y.o. female.    Chief Complaint: Follow-up      54-year-old female  With a past medical history of uncontrolled type 2 diabetes, hyperlipidemia, htn, and hypothyroidism here today for 1 week f/u.  Patient was seen 1 week ago for uncontrolled diabetes after an ER visit.  She was prescribed glimepiride in the past as well as Januvia and farxiga.  She took herself off of the Januvia and  Farxiga stating 1 of them gave her headache.  She is also unable to take metformin due to GI upset.  She is currently on glimepiride 2 mg in the morning and 2 mg in the evening.  She is tolerating this well.  She is also on Actos 30 mg in the morning.  She denies hypoglycemia.  Denies peripheral edema. She was unable to tolerate metformin in the past.  She has been checking her sugars twice daily since her last visit.  Fasting a.m. readings in the 200s range.  Denies polyuria, polyphagia, polydipsia.  Referral was sent out to nutritionist on her behalf as she requested prior to arrival.  She has not started this yet.     Patient had labs done prior to arrival and is here to discuss them.  In regards to her emergency room visit, she was prescribed Macrobid.  She denies dysuria, hematuria, fever, suprapubic pain, flank pain.  She reports resolution of the symptoms she initially presented to the emergency room for.  She voices no acute issues at this time.     Past Medical History:   Diagnosis Date    DM (diabetes mellitus)     TYPE 2    Elevated blood-pressure reading without diagnosis of hypertension     Hypothyroidism     Perimenopausal disorder       Social History     Socioeconomic History    Marital status:      Spouse name: Not on file    Number of children: Not on file    Years of education: Not on file    Highest education level: Not on file   Occupational History    Not on file   Social Needs    Financial resource strain: Not on file    Food insecurity:     Worry:  Not on file     Inability: Not on file    Transportation needs:     Medical: Not on file     Non-medical: Not on file   Tobacco Use    Smoking status: Former Smoker    Smokeless tobacco: Never Used   Substance and Sexual Activity    Alcohol use: Never     Frequency: Never    Drug use: Never    Sexual activity: Not on file   Lifestyle    Physical activity:     Days per week: Not on file     Minutes per session: Not on file    Stress: Not on file   Relationships    Social connections:     Talks on phone: Not on file     Gets together: Not on file     Attends [employer] service: Not on file     Active member of club or organization: Not on file     Attends meetings of clubs or organizations: Not on file     Relationship status: Not on file   Other Topics Concern    Not on file   Social History Narrative    Not on file      Family History   Problem Relation Age of Onset    Diabetes Mother     Hypertension Mother     Diabetes Father     Colon cancer Father     Stroke Sister         ROS:   Review of Systems   Constitutional: Negative for chills, diaphoresis, fatigue, fever and unexpected weight change.   HENT: Negative for congestion, postnasal drip, rhinorrhea and sneezing.    Eyes: Negative for photophobia, discharge, itching and visual disturbance.   Respiratory: Negative for cough, shortness of breath and wheezing.    Cardiovascular: Negative for chest pain, palpitations and leg swelling.   Gastrointestinal: Positive for constipation. Negative for abdominal pain, diarrhea, nausea and vomiting.   Endocrine: Negative for cold intolerance, heat intolerance, polydipsia, polyphagia and polyuria.   Genitourinary: Negative.    Musculoskeletal: Negative.    Skin: Negative for wound.   Allergic/Immunologic: Positive for environmental allergies.   Neurological: Positive for numbness (bilateral feet). Negative for dizziness, seizures, syncope and headaches (twice weekly, frontal, relieved with ibuprofen).  "  Hematological: Negative.    Psychiatric/Behavioral: Negative for agitation, dysphoric mood and sleep disturbance. The patient is not nervous/anxious.    All other systems reviewed and are negative.    Objective:   Physical Exam   Constitutional: She is oriented to person, place, and time. Vital signs are normal. She appears well-developed and well-nourished. She is active.  Non-toxic appearance. She does not have a sickly appearance. She does not appear ill. No distress.   HENT:   Nose: Nose normal.   Mouth/Throat: Oropharynx is clear and moist.   Eyes: Pupils are equal, round, and reactive to light. Right eye exhibits no discharge. Left eye exhibits no discharge.   Neck: Neck supple. No thyromegaly present.   Cardiovascular: Normal rate, regular rhythm and normal heart sounds.   No murmur heard.  Pulmonary/Chest: Effort normal and breath sounds normal. She has no wheezes. She has no rales.   Abdominal: Soft. Bowel sounds are normal. She exhibits mass (left upper quad- states "port from lapband sugery").   Musculoskeletal: Normal range of motion. She exhibits no edema.   Neurological: She is alert and oriented to person, place, and time.   Skin: Skin is warm and dry. No lesion noted. No erythema.   Psychiatric: She has a normal mood and affect. Her speech is normal and behavior is normal. Judgment and thought content normal. Cognition and memory are normal.   Nursing note and vitals reviewed.    Assessment:     1. Diabetes mellitus type 2 with neurological manifestations    2. Candidal urinary tract infection    3. Hyperlipidemia, unspecified hyperlipidemia type      Plan:     Problem List Items Addressed This Visit        Cardiac/Vascular    Hyperlipidemia    Overview     Unable to tolerate Atorvastin 2/t headache.      Bile acid sequestrant not an option 2/t constipation.         Current Assessment & Plan     Patient states that she has not started atorvastatin 40 mg yet.  It just came into the mail.      Lipid " profile  Total cholesterol 244, triglycerides 213, HDL 28,  - recommend  atorvastatin 40 mg with repeat lipid in 3 month.          Relevant Orders    Lipid panel       Renal/    Candidal urinary tract infection    Current Assessment & Plan     Fluconazole 200mg followed by 100mg daily x 6 days.     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          Current Assessment & Plan     Patient is currently on glimepiride 2 mg a day a.m. and 2 mg at p.m. she is also taking Actos 30 mg in the morning.  She is tolerating all both medications and denies hypoglycemia.  Will increase her glimepiride to 4 mg and 4 mg.  New Rx input into record.  Patient voices understanding to increase her glimepiride to 2 tablets in the morning and 2 tablets in the evening.  She will call for the next refill       Hemoglobin A1c 11.3.  Diabetes nutritionist referral set prior to patient coming today.  Repeat A1c in 3 months     Hyponatremia likely secondary to peripheral edema        CBC unremarkable  TSH, free T4 WNL            Relevant Medications    glimepiride (AMARYL) 2 MG tablet    Other Relevant Orders    CBC auto differential    Comprehensive metabolic panel    Hemoglobin A1c    Lipid panel       All labs were discussed with the patient thoroughly.  All questions were answered to her liking.       Patient voices understanding to in [place] sees her glimepiride to 4 mg in the morning and 4 mg in the evening.  She was given orders to get a urinalysis in 1 week.  She was also given orders to get CBC, CMP, hemoglobin A1c, lipid panel  in 3 months.     Medical records request sent to her cardiologist, Dr. Trent.     Follow up:     There are no Patient Instructions on file for this visit.     Follow up in about 3 months (around 4/30/2020), or if symptoms worsen or fail to improve.

## 2020-01-31 NOTE — ASSESSMENT & PLAN NOTE
Patient states that she has not started atorvastatin 40 mg yet.  It just came into the mail.      Lipid profile  Total cholesterol 244, triglycerides 213, HDL 28,  - recommend  atorvastatin 40 mg with repeat lipid in 3 month.

## 2020-01-31 NOTE — ASSESSMENT & PLAN NOTE
Fluconazole 200mg followed by 100mg daily x 6 days.     Urinalysis showing 4+ glucose, 1+ ketones, positive budding yeast - treat with fluconazole oral x 1 week w/ repeat UA     Patient was given orders to repeat on 02/07/2020 prior to departure

## 2020-01-31 NOTE — ASSESSMENT & PLAN NOTE
Patient is currently on glimepiride 2 mg a day a.m. and 2 mg at p.m. she is also taking Actos 30 mg in the morning.  She is tolerating all both medications and denies hypoglycemia.  Will increase her glimepiride to 4 mg and 4 mg.  New Rx input into record.  Patient voices understanding to increase her glimepiride to 2 tablets in the morning and 2 tablets in the evening.  She will call for the next refill       Hemoglobin A1c 11.3.  Diabetes nutritionist referral set prior to patient coming today.  Repeat A1c in 3 months     Hyponatremia likely secondary to peripheral edema        CBC unremarkable  TSH, free T4 WNL

## 2020-02-03 NOTE — TELEPHONE ENCOUNTER
"Protein electrophoresis, serum DM    "Elevation in the beta region can be seen with hyper Lipo proteinemia, iron deficiency anemia, and acute inflammation."    DM paraprotein not detected      IgG, IgA, IgM all within normal range.       "

## 2020-02-07 DIAGNOSIS — E11.49 DIABETES MELLITUS TYPE 2 WITH NEUROLOGICAL MANIFESTATIONS: Primary | ICD-10-CM

## 2020-02-10 RX ORDER — GLIMEPIRIDE 2 MG/1
4 TABLET ORAL 2 TIMES DAILY WITH MEALS
Qty: 360 TABLET | Refills: 3
Start: 2020-02-10 | End: 2020-10-14 | Stop reason: SDUPTHER

## 2020-02-11 ENCOUNTER — TELEPHONE (OUTPATIENT)
Dept: FAMILY MEDICINE | Facility: CLINIC | Age: 55
End: 2020-02-11

## 2020-02-11 NOTE — TELEPHONE ENCOUNTER
Urinalysis dating 2/10     4+ glucose  2+ nitrite  Bacteria 4+  Few epithelials      Urine culture has been reflexed.  Will follow up

## 2020-02-13 ENCOUNTER — TELEPHONE (OUTPATIENT)
Dept: FAMILY MEDICINE | Facility: CLINIC | Age: 55
End: 2020-02-13

## 2020-02-13 DIAGNOSIS — N39.0 E. COLI UTI (URINARY TRACT INFECTION): Primary | ICD-10-CM

## 2020-02-13 DIAGNOSIS — B96.20 E. COLI UTI (URINARY TRACT INFECTION): Primary | ICD-10-CM

## 2020-02-13 RX ORDER — AMOXICILLIN AND CLAVULANATE POTASSIUM 875; 125 MG/1; MG/1
1 TABLET, FILM COATED ORAL 2 TIMES DAILY
Qty: 10 TABLET | Refills: 0 | Status: SHIPPED | OUTPATIENT
Start: 2020-02-13 | End: 2020-02-18

## 2020-02-13 NOTE — TELEPHONE ENCOUNTER
2/13/20 -     E. Coli sensitive to Augmentin. Rx x 5 days.  Repeat urinalysis in 7 days.  If she persists to have the UTI,  Will refer her to Urology

## 2020-02-14 ENCOUNTER — TELEPHONE (OUTPATIENT)
Dept: FAMILY MEDICINE | Facility: CLINIC | Age: 55
End: 2020-02-14

## 2020-02-14 DIAGNOSIS — E11.49 DIABETES MELLITUS TYPE 2 WITH NEUROLOGICAL MANIFESTATIONS: Primary | ICD-10-CM

## 2020-02-14 DIAGNOSIS — E11.42 DIABETIC POLYNEUROPATHY ASSOCIATED WITH TYPE 2 DIABETES MELLITUS: ICD-10-CM

## 2020-02-14 NOTE — TELEPHONE ENCOUNTER
Pt states her Free-style Lashae has fallen from her arm twice after only 6 days. So she is not currently able to monitor her B/S. She usually wears a hoodie with a t-shirt also, but doesn't feel that it's an issue on her end. Any other options?

## 2020-02-17 ENCOUNTER — PATIENT MESSAGE (OUTPATIENT)
Dept: FAMILY MEDICINE | Facility: CLINIC | Age: 55
End: 2020-02-17

## 2020-02-17 NOTE — TELEPHONE ENCOUNTER
She can try to get the blood-glucose transmitter (DEXCOM G6 TRANSMITTER). Im not sure of anything else. I have to state that she is taking her glucose atleast 4 times a day. Not sure what the order requires. She can come  the order.

## 2020-02-26 ENCOUNTER — TELEPHONE (OUTPATIENT)
Dept: FAMILY MEDICINE | Facility: CLINIC | Age: 55
End: 2020-02-26

## 2020-02-26 RX ORDER — PIOGLITAZONEHYDROCHLORIDE 45 MG/1
45 TABLET ORAL DAILY
Qty: 90 TABLET | Refills: 3 | Status: SHIPPED | OUTPATIENT
Start: 2020-02-26 | End: 2020-10-14 | Stop reason: SDUPTHER

## 2020-02-26 NOTE — TELEPHONE ENCOUNTER
No bacteria. No yeast.     A lot of sugar still in her urine. Suspect her diabetes is still unregulated. She has to get control of this or she will get infections again.

## 2020-03-18 ENCOUNTER — OFFICE VISIT (OUTPATIENT)
Dept: FAMILY MEDICINE | Facility: CLINIC | Age: 55
End: 2020-03-18
Payer: COMMERCIAL

## 2020-03-18 VITALS
HEIGHT: 68 IN | DIASTOLIC BLOOD PRESSURE: 80 MMHG | SYSTOLIC BLOOD PRESSURE: 110 MMHG | OXYGEN SATURATION: 99 % | WEIGHT: 192 LBS | BODY MASS INDEX: 29.1 KG/M2 | RESPIRATION RATE: 16 BRPM | TEMPERATURE: 99 F | HEART RATE: 88 BPM

## 2020-03-18 DIAGNOSIS — J30.9 ALLERGIC SINUSITIS: ICD-10-CM

## 2020-03-18 DIAGNOSIS — J30.2 SEASONAL ALLERGIC RHINITIS, UNSPECIFIED TRIGGER: Primary | ICD-10-CM

## 2020-03-18 PROCEDURE — 99212 OFFICE O/P EST SF 10 MIN: CPT | Mod: S$GLB,,, | Performed by: NURSE PRACTITIONER

## 2020-03-18 PROCEDURE — 3008F PR BODY MASS INDEX (BMI) DOCUMENTED: ICD-10-PCS | Mod: CPTII,S$GLB,, | Performed by: NURSE PRACTITIONER

## 2020-03-18 PROCEDURE — 3008F BODY MASS INDEX DOCD: CPT | Mod: CPTII,S$GLB,, | Performed by: NURSE PRACTITIONER

## 2020-03-18 PROCEDURE — 99212 PR OFFICE/OUTPT VISIT, EST, LEVL II, 10-19 MIN: ICD-10-PCS | Mod: S$GLB,,, | Performed by: NURSE PRACTITIONER

## 2020-03-18 RX ORDER — MONTELUKAST SODIUM 10 MG/1
10 TABLET ORAL NIGHTLY
Qty: 30 TABLET | Refills: 0 | Status: SHIPPED | OUTPATIENT
Start: 2020-03-18 | End: 2020-04-17

## 2020-03-18 RX ORDER — BROMPHENIRAMINE MALEATE, PSEUDOEPHEDRINE HYDROCHLORIDE, AND DEXTROMETHORPHAN HYDROBROMIDE 2; 30; 10 MG/5ML; MG/5ML; MG/5ML
10 SYRUP ORAL 4 TIMES DAILY PRN
Qty: 120 ML | Refills: 0 | Status: SHIPPED | OUTPATIENT
Start: 2020-03-18 | End: 2020-03-28

## 2020-03-18 RX ORDER — PREDNISONE 5 MG/1
5 TABLET ORAL 2 TIMES DAILY
Qty: 10 TABLET | Refills: 0 | Status: SHIPPED | OUTPATIENT
Start: 2020-03-18 | End: 2020-03-23

## 2020-03-18 NOTE — ASSESSMENT & PLAN NOTE
Patient requesting steroid despite the fact that she knows her sugars will be elevated.  Will issue her low dose of steroid.     She will discontinue the DayQuil and start Bromfed DM throughout the day.  She was instructed to stop Bromfed at least 6 hr prior to bedtime.  She can use NyQuil at that point.  She voiced understanding of these instructions.     Will also start her on maintenance Singulair.     She was also instructed to return to clinic on Monday if she has not improved.  At that time I would issue her an antibiotic.

## 2020-03-18 NOTE — PROGRESS NOTES
Subjective:      Patient ID: Margaret Pate is a 54 y.o. female.    Chief Complaint: Sore Throat (Started last week. Allergies/sneezing d/t pollen.) and Cough (Somewhat productive; drip in back of throat.)      54-year-old female  With a past medical history of uncontrolled type 2 diabetes, hyperlipidemia, htn, and hypothyroidism here today for complaints of sinus pressure, congestion, rhinorrhea, sore throat, and posterior nasal drainage.  She states that her allergies began to flare up week ago and have progressively worsened.  She denies fever, chills, body aches.  Denies cough, shortness of breath, lymphadenopathy.  Rhinorrhea is clear and persistent despite using DayQuil as well as NyQuil.  She is not on antihistamines at this time.  States that the sore throat developed over the past 2 days because of all the drainage in the back of the throat.     No other issues reported this time        Past Medical History:   Diagnosis Date    DM (diabetes mellitus)     TYPE 2    Elevated blood-pressure reading without diagnosis of hypertension     Hypothyroidism     Perimenopausal disorder       Social History     Socioeconomic History    Marital status:      Spouse name: Not on file    Number of children: Not on file    Years of education: Not on file    Highest education level: Not on file   Occupational History    Not on file   Social Needs    Financial resource strain: Not on file    Food insecurity:     Worry: Not on file     Inability: Not on file    Transportation needs:     Medical: Not on file     Non-medical: Not on file   Tobacco Use    Smoking status: Former Smoker    Smokeless tobacco: Never Used   Substance and Sexual Activity    Alcohol use: Never     Frequency: Never    Drug use: Never    Sexual activity: Not on file   Lifestyle    Physical activity:     Days per week: Not on file     Minutes per session: Not on file    Stress: Not on file   Relationships    Social connections:      Talks on phone: Not on file     Gets together: Not on file     Attends Alevism service: Not on file     Active member of club or organization: Not on file     Attends meetings of clubs or organizations: Not on file     Relationship status: Not on file   Other Topics Concern    Not on file   Social History Narrative    Not on file      Family History   Problem Relation Age of Onset    Diabetes Mother     Hypertension Mother     Diabetes Father     Colon cancer Father     Stroke Sister         ROS:   Review of Systems   Constitutional: Negative for chills, fatigue and fever.   HENT: Positive for congestion, postnasal drip, sinus pressure and sneezing. Negative for ear pain, facial swelling, hearing loss, sinus pain, tinnitus, trouble swallowing and voice change.    Eyes: Negative.    Respiratory: Negative.    Cardiovascular: Negative.    Gastrointestinal: Negative.    Endocrine: Negative.    Genitourinary: Negative.    Skin: Negative.    Allergic/Immunologic: Positive for environmental allergies. Negative for food allergies and immunocompromised state.   Neurological: Negative.    Hematological: Negative.    All other systems reviewed and are negative.    Objective:   Physical Exam   Constitutional: She is oriented to person, place, and time. Vital signs are normal. She appears well-developed and well-nourished. She is active.  Non-toxic appearance. She does not have a sickly appearance. She does not appear ill. No distress.   HENT:   Head: Normocephalic and atraumatic.   Right Ear: External ear normal.   Left Ear: External ear normal.   Nose: Mucosal edema and rhinorrhea present.   Mouth/Throat: Uvula is midline, oropharynx is clear and moist and mucous membranes are normal.       Eyes: Pupils are equal, round, and reactive to light. Right eye exhibits no discharge. Left eye exhibits no discharge.   Neck: Neck supple. No thyromegaly present.   Cardiovascular: Normal rate, regular rhythm and normal heart  "sounds.   No murmur heard.  Pulmonary/Chest: Effort normal and breath sounds normal. She has no wheezes. She has no rhonchi. She has no rales.   Abdominal: Soft. Bowel sounds are normal. She exhibits mass (left upper quad- states "port from lapband sugery").   Musculoskeletal: Normal range of motion. She exhibits no edema.   Lymphadenopathy:        Head (right side): No tonsillar adenopathy present.        Head (left side): No tonsillar adenopathy present.     She has no cervical adenopathy.   Neurological: She is alert and oriented to person, place, and time.   Skin: Skin is warm and dry. No lesion noted. No erythema.   Psychiatric: She has a normal mood and affect. Her speech is normal and behavior is normal. Judgment and thought content normal. Cognition and memory are normal.   Nursing note and vitals reviewed.    Assessment:     1. Seasonal allergic rhinitis, unspecified trigger    2. Allergic sinusitis      No images are attached to the encounter.   Plan:     Problem List Items Addressed This Visit        Other    Seasonal allergic rhinitis - Primary    Current Assessment & Plan     Patient requesting steroid despite the fact that she knows her sugars will be elevated.  Will issue her low dose of steroid.     She will discontinue the DayQuil and start Bromfed DM throughout the day.  She was instructed to stop Bromfed at least 6 hr prior to bedtime.  She can use NyQuil at that point.  She voiced understanding of these instructions.     Will also start her on maintenance Singulair.     She was also instructed to return to clinic on Monday if she has not improved.  At that time I would issue her an antibiotic.          Relevant Medications    montelukast (SINGULAIR) 10 mg tablet    predniSONE (DELTASONE) 5 MG tablet      Other Visit Diagnoses     Allergic sinusitis        Relevant Medications    montelukast (SINGULAIR) 10 mg tablet    predniSONE (DELTASONE) 5 MG tablet    brompheniramine-pseudoeph-DM (BROMFED DM) " 2-30-10 mg/5 mL Syrp        Procedures     Follow up:     There are no Patient Instructions on file for this visit.     Follow up if symptoms worsen or fail to improve.

## 2020-07-27 ENCOUNTER — PATIENT OUTREACH (OUTPATIENT)
Dept: ADMINISTRATIVE | Facility: HOSPITAL | Age: 55
End: 2020-07-27

## 2020-07-27 NOTE — LETTER
July 27, 2020    [facility]  [address]  [facility] [address]  Phone: [phone] July 27, 2020     Patient: Margaret Pate    YOB: 1965   Date of Visit: 7/27/2020       To whom it may concern       We are seeing Margaret Pate, MAGALIE.O.B is 1965, at [facility]. Keon Mejia NP is their primary care physician. To help with our christiano maintenance records could you please send the following:     Most recent Lab Results    Please send fax to [phone] or e-mail to [email or web address], Attention Bella BENAVIDES LPN Care Coordination.    Thank-you in advance for your assistance. If you have any questions or concerns, please don't hesitate to contact me at [phone].     Bella VELAZCO LPN  Care Coordination Department  [facility]

## 2020-07-27 NOTE — PROGRESS NOTES
DM Report 07/27/2020.  Labs ordered on 01/31/2020.Requested from [facility]   Needs pcp visit. Pt will call pcp office to schedule apt.

## 2020-10-14 DIAGNOSIS — D75.1 ERYTHROCYTOSIS: ICD-10-CM

## 2020-10-14 DIAGNOSIS — E78.5 HYPERLIPIDEMIA, UNSPECIFIED HYPERLIPIDEMIA TYPE: ICD-10-CM

## 2020-10-14 DIAGNOSIS — G25.81 RESTLESS LEGS: ICD-10-CM

## 2020-10-14 DIAGNOSIS — E11.42 DIABETIC POLYNEUROPATHY ASSOCIATED WITH TYPE 2 DIABETES MELLITUS: ICD-10-CM

## 2020-10-14 DIAGNOSIS — E11.49 DIABETES MELLITUS TYPE 2 WITH NEUROLOGICAL MANIFESTATIONS: ICD-10-CM

## 2020-10-14 DIAGNOSIS — R79.9 ABNORMAL SERUM TOTAL PROTEIN LEVEL: ICD-10-CM

## 2020-10-14 DIAGNOSIS — G47.30 SLEEP APNEA, UNSPECIFIED TYPE: ICD-10-CM

## 2020-10-14 RX ORDER — PIOGLITAZONEHYDROCHLORIDE 45 MG/1
45 TABLET ORAL DAILY
Qty: 90 TABLET | Refills: 3 | Status: SHIPPED | OUTPATIENT
Start: 2020-10-14 | End: 2021-10-21 | Stop reason: SDUPTHER

## 2020-10-14 RX ORDER — GLIMEPIRIDE 2 MG/1
4 TABLET ORAL 2 TIMES DAILY WITH MEALS
Qty: 360 TABLET | Refills: 3
Start: 2020-10-14 | End: 2021-01-12

## 2020-10-14 RX ORDER — ATORVASTATIN CALCIUM 40 MG/1
40 TABLET, FILM COATED ORAL DAILY
Qty: 90 TABLET | Refills: 1 | Status: SHIPPED | OUTPATIENT
Start: 2020-10-14 | End: 2021-04-15

## 2020-10-14 RX ORDER — GABAPENTIN 300 MG/1
300 CAPSULE ORAL NIGHTLY
Qty: 90 CAPSULE | Refills: 2 | Status: SHIPPED | OUTPATIENT
Start: 2020-10-14 | End: 2021-08-22 | Stop reason: SDUPTHER

## 2020-12-29 ENCOUNTER — OFFICE VISIT (OUTPATIENT)
Dept: FAMILY MEDICINE | Facility: CLINIC | Age: 55
End: 2020-12-29
Payer: COMMERCIAL

## 2020-12-29 VITALS
SYSTOLIC BLOOD PRESSURE: 131 MMHG | OXYGEN SATURATION: 99 % | TEMPERATURE: 98 F | RESPIRATION RATE: 14 BRPM | HEART RATE: 62 BPM | DIASTOLIC BLOOD PRESSURE: 64 MMHG | WEIGHT: 226.63 LBS | HEIGHT: 68 IN | BODY MASS INDEX: 34.35 KG/M2

## 2020-12-29 DIAGNOSIS — F43.23 SITUATIONAL MIXED ANXIETY AND DEPRESSIVE DISORDER: Primary | ICD-10-CM

## 2020-12-29 DIAGNOSIS — M54.50 ACUTE BILATERAL LOW BACK PAIN WITHOUT SCIATICA: ICD-10-CM

## 2020-12-29 DIAGNOSIS — J32.9 SINUSITIS, UNSPECIFIED CHRONICITY, UNSPECIFIED LOCATION: ICD-10-CM

## 2020-12-29 PROCEDURE — 3008F PR BODY MASS INDEX (BMI) DOCUMENTED: ICD-10-PCS | Mod: CPTII,S$GLB,, | Performed by: NURSE PRACTITIONER

## 2020-12-29 PROCEDURE — 99213 OFFICE O/P EST LOW 20 MIN: CPT | Mod: S$GLB,,, | Performed by: NURSE PRACTITIONER

## 2020-12-29 PROCEDURE — 1126F AMNT PAIN NOTED NONE PRSNT: CPT | Mod: S$GLB,,, | Performed by: NURSE PRACTITIONER

## 2020-12-29 PROCEDURE — 1126F PR PAIN SEVERITY QUANTIFIED, NO PAIN PRESENT: ICD-10-PCS | Mod: S$GLB,,, | Performed by: NURSE PRACTITIONER

## 2020-12-29 PROCEDURE — 99213 PR OFFICE/OUTPT VISIT, EST, LEVL III, 20-29 MIN: ICD-10-PCS | Mod: S$GLB,,, | Performed by: NURSE PRACTITIONER

## 2020-12-29 PROCEDURE — 3008F BODY MASS INDEX DOCD: CPT | Mod: CPTII,S$GLB,, | Performed by: NURSE PRACTITIONER

## 2020-12-29 RX ORDER — CYCLOBENZAPRINE HCL 10 MG
10 TABLET ORAL 3 TIMES DAILY PRN
Qty: 30 TABLET | Refills: 0 | Status: SHIPPED | OUTPATIENT
Start: 2020-12-29 | End: 2021-01-08

## 2020-12-29 RX ORDER — PREDNISONE 5 MG/1
5 TABLET ORAL 2 TIMES DAILY
Qty: 10 TABLET | Refills: 0 | Status: SHIPPED | OUTPATIENT
Start: 2020-12-29 | End: 2021-01-03

## 2020-12-29 RX ORDER — BROMPHENIRAMINE MALEATE, PSEUDOEPHEDRINE HYDROCHLORIDE, AND DEXTROMETHORPHAN HYDROBROMIDE 2; 30; 10 MG/5ML; MG/5ML; MG/5ML
10 SYRUP ORAL
Qty: 120 ML | Refills: 0 | Status: SHIPPED | OUTPATIENT
Start: 2020-12-29 | End: 2021-01-08

## 2020-12-29 RX ORDER — VENLAFAXINE HYDROCHLORIDE 37.5 MG/1
37.5 CAPSULE, EXTENDED RELEASE ORAL DAILY
Qty: 30 CAPSULE | Refills: 2 | Status: SHIPPED | OUTPATIENT
Start: 2020-12-29 | End: 2021-03-31 | Stop reason: SDUPTHER

## 2020-12-29 RX ORDER — AMOXICILLIN 500 MG/1
500 TABLET, FILM COATED ORAL EVERY 12 HOURS
Qty: 20 TABLET | Refills: 0 | Status: SHIPPED | OUTPATIENT
Start: 2020-12-29 | End: 2021-01-08
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
## 2020-12-29 NOTE — ASSESSMENT & PLAN NOTE
Secondary to losing home recently.      Patient will start Effexor.  She has tolerated this medication well in the past.  She will return to clinic in 2 weeks for reassessment or sooner if she develops worsening symptoms

## 2021-03-31 DIAGNOSIS — F43.23 SITUATIONAL MIXED ANXIETY AND DEPRESSIVE DISORDER: ICD-10-CM

## 2021-04-01 RX ORDER — VENLAFAXINE HYDROCHLORIDE 37.5 MG/1
37.5 CAPSULE, EXTENDED RELEASE ORAL DAILY
Qty: 30 CAPSULE | Refills: 2 | Status: SHIPPED | OUTPATIENT
Start: 2021-04-01 | End: 2021-04-15

## 2021-06-18 ENCOUNTER — TELEPHONE (OUTPATIENT)
Dept: FAMILY MEDICINE | Facility: CLINIC | Age: 56
End: 2021-06-18

## 2021-06-18 RX ORDER — SULFAMETHOXAZOLE AND TRIMETHOPRIM 800; 160 MG/1; MG/1
1 TABLET ORAL 2 TIMES DAILY
Qty: 14 TABLET | Refills: 0 | Status: SHIPPED | OUTPATIENT
Start: 2021-06-18 | End: 2021-06-25

## 2021-06-21 ENCOUNTER — OFFICE VISIT (OUTPATIENT)
Dept: FAMILY MEDICINE | Facility: CLINIC | Age: 56
End: 2021-06-21
Payer: COMMERCIAL

## 2021-06-21 ENCOUNTER — TELEPHONE (OUTPATIENT)
Dept: FAMILY MEDICINE | Facility: CLINIC | Age: 56
End: 2021-06-21

## 2021-06-21 VITALS
WEIGHT: 226 LBS | OXYGEN SATURATION: 98 % | SYSTOLIC BLOOD PRESSURE: 140 MMHG | HEART RATE: 78 BPM | DIASTOLIC BLOOD PRESSURE: 64 MMHG | BODY MASS INDEX: 35.47 KG/M2 | HEIGHT: 67 IN

## 2021-06-21 DIAGNOSIS — F43.23 SITUATIONAL MIXED ANXIETY AND DEPRESSIVE DISORDER: ICD-10-CM
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
## 2021-06-21 DIAGNOSIS — E11.49 DIABETES MELLITUS TYPE 2 WITH NEUROLOGICAL MANIFESTATIONS: ICD-10-CM

## 2021-06-21 DIAGNOSIS — L08.9 CAT BITE OF LEFT LOWER LEG WITH INFECTION, INITIAL ENCOUNTER: Primary | ICD-10-CM

## 2021-06-21 DIAGNOSIS — S81.852A CAT BITE OF LEFT LOWER LEG WITH INFECTION, INITIAL ENCOUNTER: Primary | ICD-10-CM

## 2021-06-21 PROCEDURE — 99213 PR OFFICE/OUTPT VISIT, EST, LEVL III, 20-29 MIN: ICD-10-PCS | Mod: 25,S$GLB,, | Performed by: NURSE PRACTITIONER

## 2021-06-21 PROCEDURE — 90471 PR IMMUNIZ ADMIN,1 SINGLE/COMB VAC/TOXOID: ICD-10-PCS | Mod: S$GLB,,, | Performed by: NURSE PRACTITIONER

## 2021-06-21 PROCEDURE — 99213 OFFICE O/P EST LOW 20 MIN: CPT | Mod: 25,S$GLB,, | Performed by: NURSE PRACTITIONER

## 2021-06-21 PROCEDURE — 90715 TDAP VACCINE 7 YRS/> IM: CPT | Mod: S$GLB,,, | Performed by: NURSE PRACTITIONER

## 2021-06-21 PROCEDURE — 3008F BODY MASS INDEX DOCD: CPT | Mod: CPTII,S$GLB,, | Performed by: NURSE PRACTITIONER

## 2021-06-21 PROCEDURE — 90715 PR TDAP VACCINE >7 YO, IM: ICD-10-PCS | Mod: S$GLB,,, | Performed by: NURSE PRACTITIONER

## 2021-06-21 PROCEDURE — 90471 IMMUNIZATION ADMIN: CPT | Mod: S$GLB,,, | Performed by: NURSE PRACTITIONER

## 2021-06-21 PROCEDURE — 3008F PR BODY MASS INDEX (BMI) DOCUMENTED: ICD-10-PCS | Mod: CPTII,S$GLB,, | Performed by: NURSE PRACTITIONER

## 2021-06-21 RX ORDER — DOXYCYCLINE HYCLATE 100 MG
100 TABLET ORAL 2 TIMES DAILY
Qty: 20 TABLET | Refills: 0 | Status: SHIPPED | OUTPATIENT
Start: 2021-06-21 | End: 2021-07-01

## 2021-06-21 RX ORDER — VENLAFAXINE HYDROCHLORIDE 37.5 MG/1
CAPSULE, EXTENDED RELEASE ORAL
Qty: 90 CAPSULE | Refills: 3 | Status: SHIPPED | OUTPATIENT
Start: 2021-06-21 | End: 2022-02-11 | Stop reason: SDUPTHER

## 2021-06-22 RX ORDER — LANCETS
1 EACH MISCELLANEOUS DAILY
Qty: 200 EACH | Refills: 0 | Status: SHIPPED | OUTPATIENT
Start: 2021-06-22 | End: 2021-07-09 | Stop reason: SDUPTHER

## 2021-06-22 RX ORDER — INSULIN PUMP SYRINGE, 3 ML
EACH MISCELLANEOUS
Qty: 1 EACH | Refills: 0 | Status: SHIPPED | OUTPATIENT
Start: 2021-06-22 | End: 2021-07-09 | Stop reason: SDUPTHER

## 2021-06-24 ENCOUNTER — OFFICE VISIT (OUTPATIENT)
Dept: FAMILY MEDICINE | Facility: CLINIC | Age: 56
End: 2021-06-24
Payer: COMMERCIAL

## 2021-06-24 ENCOUNTER — TELEPHONE (OUTPATIENT)
Dept: FAMILY MEDICINE | Facility: CLINIC | Age: 56
End: 2021-06-24

## 2021-06-24 VITALS
BODY MASS INDEX: 35.79 KG/M2 | HEART RATE: 83 BPM | RESPIRATION RATE: 17 BRPM | HEIGHT: 67 IN | SYSTOLIC BLOOD PRESSURE: 153 MMHG | WEIGHT: 228 LBS | OXYGEN SATURATION: 98 % | DIASTOLIC BLOOD PRESSURE: 67 MMHG
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
## 2021-06-24 DIAGNOSIS — L02.91 ABSCESS: Primary | ICD-10-CM

## 2021-06-24 PROCEDURE — 99213 PR OFFICE/OUTPT VISIT, EST, LEVL III, 20-29 MIN: ICD-10-PCS | Mod: S$GLB,,, | Performed by: NURSE PRACTITIONER

## 2021-06-24 PROCEDURE — 3008F BODY MASS INDEX DOCD: CPT | Mod: CPTII,S$GLB,, | Performed by: NURSE PRACTITIONER

## 2021-06-24 PROCEDURE — 3008F PR BODY MASS INDEX (BMI) DOCUMENTED: ICD-10-PCS | Mod: CPTII,S$GLB,, | Performed by: NURSE PRACTITIONER

## 2021-06-24 PROCEDURE — 99213 OFFICE O/P EST LOW 20 MIN: CPT | Mod: S$GLB,,, | Performed by: NURSE PRACTITIONER

## 2021-07-09 ENCOUNTER — OFFICE VISIT (OUTPATIENT)
Dept: FAMILY MEDICINE | Facility: CLINIC | Age: 56
End: 2021-07-09
Payer: COMMERCIAL

## 2021-07-09 VITALS
BODY MASS INDEX: 36.1 KG/M2 | RESPIRATION RATE: 16 BRPM | HEART RATE: 78 BPM | DIASTOLIC BLOOD PRESSURE: 87 MMHG | SYSTOLIC BLOOD PRESSURE: 139 MMHG | WEIGHT: 230 LBS | HEIGHT: 67 IN | TEMPERATURE: 98 F | OXYGEN SATURATION: 97 %

## 2021-07-09 DIAGNOSIS — E11.49 DIABETES MELLITUS TYPE 2 WITH NEUROLOGICAL MANIFESTATIONS: Primary | Chronic | ICD-10-CM

## 2021-07-09 DIAGNOSIS — L08.9 CAT BITE OF LEFT LOWER LEG WITH INFECTION, INITIAL ENCOUNTER: ICD-10-CM

## 2021-07-09 DIAGNOSIS — S81.852A CAT BITE OF LEFT LOWER LEG WITH INFECTION, INITIAL ENCOUNTER: ICD-10-CM
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
## 2021-07-09 DIAGNOSIS — E88.819 INSULIN RESISTANCE: Chronic | ICD-10-CM

## 2021-07-09 LAB — HBA1C MFR BLD: 9 %

## 2021-07-09 PROCEDURE — 99214 PR OFFICE/OUTPT VISIT, EST, LEVL IV, 30-39 MIN: ICD-10-PCS | Mod: 25,S$GLB,, | Performed by: NURSE PRACTITIONER

## 2021-07-09 PROCEDURE — 83036 HEMOGLOBIN GLYCOSYLATED A1C: CPT | Mod: QW,,, | Performed by: NURSE PRACTITIONER

## 2021-07-09 PROCEDURE — 3008F PR BODY MASS INDEX (BMI) DOCUMENTED: ICD-10-PCS | Mod: CPTII,S$GLB,, | Performed by: NURSE PRACTITIONER

## 2021-07-09 PROCEDURE — 99214 OFFICE O/P EST MOD 30 MIN: CPT | Mod: 25,S$GLB,, | Performed by: NURSE PRACTITIONER

## 2021-07-09 PROCEDURE — 83036 POCT HEMOGLOBIN A1C: ICD-10-PCS | Mod: QW,,, | Performed by: NURSE PRACTITIONER

## 2021-07-09 PROCEDURE — 3008F BODY MASS INDEX DOCD: CPT | Mod: CPTII,S$GLB,, | Performed by: NURSE PRACTITIONER

## 2021-07-09 RX ORDER — LANCETS 33 GAUGE
EACH MISCELLANEOUS
COMMUNITY
Start: 2021-06-22 | End: 2023-06-29

## 2021-07-09 RX ORDER — AMOXICILLIN AND CLAVULANATE POTASSIUM 875; 125 MG/1; MG/1
1 TABLET, FILM COATED ORAL EVERY 12 HOURS
Qty: 20 TABLET | Refills: 0 | Status: SHIPPED | OUTPATIENT
Start: 2021-07-09 | End: 2021-08-10

## 2021-07-09 RX ORDER — DULAGLUTIDE 1.5 MG/.5ML
1.5 INJECTION, SOLUTION SUBCUTANEOUS
Qty: 4 PEN | Refills: 11 | Status: SHIPPED | OUTPATIENT
Start: 2021-07-09 | End: 2021-10-21

## 2021-07-13 ENCOUNTER — TELEPHONE (OUTPATIENT)
Dept: FAMILY MEDICINE | Facility: CLINIC | Age: 56
End: 2021-07-13

## 2021-08-10 ENCOUNTER — OFFICE VISIT (OUTPATIENT)
Dept: FAMILY MEDICINE | Facility: CLINIC | Age: 56
End: 2021-08-10
Payer: COMMERCIAL

## 2021-08-10 VITALS
RESPIRATION RATE: 18 BRPM | OXYGEN SATURATION: 97 % | HEART RATE: 74 BPM | WEIGHT: 231 LBS | TEMPERATURE: 99 F | BODY MASS INDEX: 36.26 KG/M2 | SYSTOLIC BLOOD PRESSURE: 126 MMHG | HEIGHT: 67 IN | DIASTOLIC BLOOD PRESSURE: 81 MMHG

## 2021-08-10 DIAGNOSIS — G89.29 CHRONIC RIGHT SHOULDER PAIN: Primary | ICD-10-CM

## 2021-08-10 DIAGNOSIS — M25.311 INSTABILITY OF RIGHT SHOULDER JOINT: ICD-10-CM

## 2021-08-10 DIAGNOSIS — M25.511 CHRONIC RIGHT SHOULDER PAIN: Primary | ICD-10-CM

## 2021-08-10 DIAGNOSIS — M12.9 ARTHRITIS, MULTIPLE JOINT INVOLVEMENT: ICD-10-CM

## 2021-08-10 PROCEDURE — 3079F DIAST BP 80-89 MM HG: CPT | Mod: CPTII,S$GLB,, | Performed by: NURSE PRACTITIONER

## 2021-08-10 PROCEDURE — 99214 PR OFFICE/OUTPT VISIT, EST, LEVL IV, 30-39 MIN: ICD-10-PCS | Mod: S$GLB,,, | Performed by: NURSE PRACTITIONER

## 2021-08-10 PROCEDURE — 3052F HG A1C>EQUAL 8.0%<EQUAL 9.0%: CPT | Mod: CPTII,S$GLB,, | Performed by: NURSE PRACTITIONER

## 2021-08-10 PROCEDURE — 3074F SYST BP LT 130 MM HG: CPT | Mod: CPTII,S$GLB,, | Performed by: NURSE PRACTITIONER

## 2021-08-10 PROCEDURE — 99214 OFFICE O/P EST MOD 30 MIN: CPT | Mod: S$GLB,,, | Performed by: NURSE PRACTITIONER

## 2021-08-10 PROCEDURE — 1159F PR MEDICATION LIST DOCUMENTED IN MEDICAL RECORD: ICD-10-PCS | Mod: CPTII,S$GLB,, | Performed by: NURSE PRACTITIONER

## 2021-08-10 PROCEDURE — 3052F PR MOST RECENT HEMOGLOBIN A1C LEVEL 8.0 - < 9.0%: ICD-10-PCS | Mod: CPTII,S$GLB,, | Performed by: NURSE PRACTITIONER

## 2021-08-10 PROCEDURE — 1159F MED LIST DOCD IN RCRD: CPT | Mod: CPTII,S$GLB,, | Performed by: NURSE PRACTITIONER

## 2021-08-10 PROCEDURE — 3079F PR MOST RECENT DIASTOLIC BLOOD PRESSURE 80-89 MM HG: ICD-10-PCS | Mod: CPTII,S$GLB,, | Performed by: NURSE PRACTITIONER

## 2021-08-10 PROCEDURE — 3008F BODY MASS INDEX DOCD: CPT | Mod: CPTII,S$GLB,, | Performed by: NURSE PRACTITIONER

## 2021-08-10 PROCEDURE — 3074F PR MOST RECENT SYSTOLIC BLOOD PRESSURE < 130 MM HG: ICD-10-PCS | Mod: CPTII,S$GLB,, | Performed by: NURSE PRACTITIONER

## 2021-08-10 PROCEDURE — 3008F PR BODY MASS INDEX (BMI) DOCUMENTED: ICD-10-PCS | Mod: CPTII,S$GLB,, | Performed by: NURSE PRACTITIONER

## 2021-08-10 RX ORDER — CELECOXIB 200 MG/1
200 CAPSULE ORAL 2 TIMES DAILY
Qty: 60 CAPSULE | Refills: 5 | Status: SHIPPED | OUTPATIENT
Start: 2021-08-10 | End: 2021-11-02

## 2021-10-28 ENCOUNTER — TELEPHONE (OUTPATIENT)
Dept: FAMILY MEDICINE | Facility: CLINIC | Age: 56
End: 2021-10-28
Payer: COMMERCIAL

## 2021-11-02 ENCOUNTER — OFFICE VISIT (OUTPATIENT)
Dept: FAMILY MEDICINE | Facility: CLINIC | Age: 56
End: 2021-11-02
Payer: COMMERCIAL

## 2021-11-02 VITALS
BODY MASS INDEX: 35.94 KG/M2 | HEIGHT: 67 IN | HEART RATE: 74 BPM | WEIGHT: 229 LBS | RESPIRATION RATE: 16 BRPM | SYSTOLIC BLOOD PRESSURE: 138 MMHG | OXYGEN SATURATION: 97 % | DIASTOLIC BLOOD PRESSURE: 82 MMHG | TEMPERATURE: 99 F

## 2021-11-02 DIAGNOSIS — L65.9 HAIR LOSS: ICD-10-CM

## 2021-11-02 DIAGNOSIS — Z79.899 LONG TERM CURRENT USE OF THERAPEUTIC DRUG: ICD-10-CM

## 2021-11-02 DIAGNOSIS — D64.9 ANEMIA, UNSPECIFIED TYPE: ICD-10-CM

## 2021-11-02 DIAGNOSIS — R53.83 FATIGUE, UNSPECIFIED TYPE: ICD-10-CM

## 2021-11-02 DIAGNOSIS — E11.49 DIABETES MELLITUS TYPE 2 WITH NEUROLOGICAL MANIFESTATIONS: Primary | Chronic | ICD-10-CM

## 2021-11-02 DIAGNOSIS — E78.5 HYPERLIPIDEMIA, UNSPECIFIED HYPERLIPIDEMIA TYPE: Chronic | ICD-10-CM

## 2021-11-02 DIAGNOSIS — Z11.59 ENCOUNTER FOR SCREENING FOR OTHER VIRAL DISEASES: ICD-10-CM

## 2021-11-02 DIAGNOSIS — Z12.31 BREAST CANCER SCREENING BY MAMMOGRAM: ICD-10-CM

## 2021-11-02 LAB
ABS NRBC COUNT: 0 X 10 3/UL (ref 0–0.01)
ABSOLUTE BASOPHIL: 0.02 X 10 3/UL (ref 0–0.22)
ABSOLUTE EOSINOPHIL: 0.16 X 10 3/UL (ref 0.04–0.54)
ABSOLUTE IMMATURE GRAN: 0.02 X 10 3/UL (ref 0–0.04)
ABSOLUTE LYMPHOCYTE: 1.69 X 10 3/UL (ref 0.86–4.75)
ABSOLUTE MONOCYTE: 0.45 X 10 3/UL (ref 0.22–1.08)
ALBUMIN SERPL-MCNC: 4.6 G/DL (ref 3.5–5.2)
ALBUMIN/GLOB SERPL ELPH: 1.8 {RATIO} (ref 1–2.7)
ALP ISOS SERPL LEV INH-CCNC: 101 U/L (ref 35–105)
ALT (SGPT): 8 U/L (ref 0–33)
ANION GAP SERPL CALC-SCNC: 12 MMOL/L (ref 8–17)
AST SERPL-CCNC: 14 U/L (ref 0–32)
B12: 385 PG/ML (ref 232–1245)
BASOPHILS NFR BLD: 0.3 % (ref 0.2–1.2)
BILIRUBIN, TOTAL: 0.44 MG/DL (ref 0–1.2)
BUN/CREAT SERPL: 13 (ref 6–20)
CALCIUM SERPL-MCNC: 9.6 MG/DL (ref 8.6–10.2)
CARBON DIOXIDE, CO2: 27 MMOL/L (ref 22–29)
CHLORIDE: 101 MMOL/L (ref 98–107)
CHOLEST SERPL-MSCNC: 169 MG/DL (ref 100–200)
CREAT SERPL-MCNC: 0.61 MG/DL (ref 0.5–0.9)
CREATININE RANDOM URINE: 141.3 MG/DL (ref 28–217)
EOSINOPHIL NFR BLD: 2.7 % (ref 0.7–7)
ESTIMATED AVERAGE GLUCOSE: 179 MG/DL
FOLATE SERPL-MCNC: 10.9 NG/ML (ref 4.4–31)
GFR ESTIMATION: 101.46
GLOBULIN: 2.6 G/DL (ref 1.5–4.5)
GLUCOSE: 177 MG/DL (ref 74–106)
HBA1C MFR BLD: 7.8 % (ref 4–6)
HCT VFR BLD AUTO: 43.3 % (ref 37–47)
HCV IGG SERPL QL IA: NONREACTIVE
HDLC SERPL-MCNC: 51 MG/DL
HGB BLD-MCNC: 14 G/DL (ref 12–16)
HIV 1+2 AB+HIV1 P24 AG SERPL QL IA: NONREACTIVE
IMMATURE GRANULOCYTES: 0.3 % (ref 0–0.5)
IRON BINDING CAPACITY: 327 UG/DL (ref 262–472)
IRON SERPL-MCNC: 75 UG/DL (ref 37–145)
LDL/HDL RATIO: 1.7 (ref 1–3)
LDLC SERPL CALC-MCNC: 88 MG/DL (ref 0–100)
LYMPHOCYTES NFR BLD: 28.8 % (ref 19.3–53.1)
MCH RBC QN AUTO: 29.3 PG (ref 27–32)
MCHC RBC AUTO-ENTMCNC: 32.3 G/DL (ref 32–36)
MCV RBC AUTO: 90.6 FL (ref 82–100)
MICROALBUMIN QUANT: <1.2 MG/DL (ref 0–2)
MICROALBUMIN/CREATININE RATIO: NORMAL UG/MG (ref 0–30)
MONOCYTES NFR BLD: 7.7 % (ref 4.7–12.5)
NEUTROPHILS # BLD AUTO: 3.53 X 10 3/UL (ref 2.15–7.56)
NEUTROPHILS NFR BLD: 60.2 % (ref 34–71.1)
NUCLEATED RED BLOOD CELLS: 0 /100 WBC (ref 0–0.2)
PLATELET # BLD AUTO: 243 X 10 3/UL (ref 135–400)
POTASSIUM: 4.7 MMOL/L (ref 3.5–5.1)
PROT SNV-MCNC: 7.2 G/DL (ref 6.4–8.3)
RBC # BLD AUTO: 4.78 X 10 6/UL (ref 4.2–5.4)
RDW-SD: 42.3 FL (ref 37–54)
SODIUM: 140 MMOL/L (ref 136–145)
TRIGL SERPL-MCNC: 150 MG/DL (ref 0–150)
TSH W/REFLEX TO FT4: 4.02 UIU/ML (ref 0.27–4.2)
UIBC SERPL-MCNC: 252 UG/DL (ref 112–306)
UREA NITROGEN (BUN): 7.9 MG/DL (ref 6–20)
WBC # BLD: 5.87 X 10 3/UL (ref 4.3–10.8)

## 2021-11-02 PROCEDURE — 3008F PR BODY MASS INDEX (BMI) DOCUMENTED: ICD-10-PCS | Mod: CPTII,S$GLB,, | Performed by: NURSE PRACTITIONER

## 2021-11-02 PROCEDURE — 3075F PR MOST RECENT SYSTOLIC BLOOD PRESS GE 130-139MM HG: ICD-10-PCS | Mod: CPTII,S$GLB,, | Performed by: NURSE PRACTITIONER

## 2021-11-02 PROCEDURE — 1160F PR REVIEW ALL MEDS BY PRESCRIBER/CLIN PHARMACIST DOCUMENTED: ICD-10-PCS | Mod: CPTII,S$GLB,, | Performed by: NURSE PRACTITIONER

## 2021-11-02 PROCEDURE — 1159F MED LIST DOCD IN RCRD: CPT | Mod: CPTII,S$GLB,, | Performed by: NURSE PRACTITIONER

## 2021-11-02 PROCEDURE — 3051F HG A1C>EQUAL 7.0%<8.0%: CPT | Mod: CPTII,S$GLB,, | Performed by: NURSE PRACTITIONER

## 2021-11-02 PROCEDURE — 3061F NEG MICROALBUMINURIA REV: CPT | Mod: CPTII,S$GLB,, | Performed by: NURSE PRACTITIONER

## 2021-11-02 PROCEDURE — 3061F PR NEG MICROALBUMINURIA RESULT DOCUMENTED/REVIEW: ICD-10-PCS | Mod: CPTII,S$GLB,, | Performed by: NURSE PRACTITIONER

## 2021-11-02 PROCEDURE — 3066F PR DOCUMENTATION OF TREATMENT FOR NEPHROPATHY: ICD-10-PCS | Mod: CPTII,S$GLB,, | Performed by: NURSE PRACTITIONER

## 2021-11-02 PROCEDURE — 1159F PR MEDICATION LIST DOCUMENTED IN MEDICAL RECORD: ICD-10-PCS | Mod: CPTII,S$GLB,, | Performed by: NURSE PRACTITIONER

## 2021-11-02 PROCEDURE — 3079F PR MOST RECENT DIASTOLIC BLOOD PRESSURE 80-89 MM HG: ICD-10-PCS | Mod: CPTII,S$GLB,, | Performed by: NURSE PRACTITIONER

## 2021-11-02 PROCEDURE — 1160F RVW MEDS BY RX/DR IN RCRD: CPT | Mod: CPTII,S$GLB,, | Performed by: NURSE PRACTITIONER

## 2021-11-02 PROCEDURE — 99214 OFFICE O/P EST MOD 30 MIN: CPT | Mod: S$GLB,,, | Performed by: NURSE PRACTITIONER

## 2021-11-02 PROCEDURE — 3066F NEPHROPATHY DOC TX: CPT | Mod: CPTII,S$GLB,, | Performed by: NURSE PRACTITIONER

## 2021-11-02 PROCEDURE — 3051F PR MOST RECENT HEMOGLOBIN A1C LEVEL 7.0 - < 8.0%: ICD-10-PCS | Mod: CPTII,S$GLB,, | Performed by: NURSE PRACTITIONER

## 2021-11-02 PROCEDURE — 3079F DIAST BP 80-89 MM HG: CPT | Mod: CPTII,S$GLB,, | Performed by: NURSE PRACTITIONER

## 2021-11-02 PROCEDURE — 3075F SYST BP GE 130 - 139MM HG: CPT | Mod: CPTII,S$GLB,, | Performed by: NURSE PRACTITIONER

## 2021-11-02 PROCEDURE — 3008F BODY MASS INDEX DOCD: CPT | Mod: CPTII,S$GLB,, | Performed by: NURSE PRACTITIONER

## 2021-11-02 PROCEDURE — 99214 PR OFFICE/OUTPT VISIT, EST, LEVL IV, 30-39 MIN: ICD-10-PCS | Mod: S$GLB,,, | Performed by: NURSE PRACTITIONER

## 2021-11-15 ENCOUNTER — OFFICE VISIT (OUTPATIENT)
Dept: FAMILY MEDICINE | Facility: CLINIC | Age: 56
End: 2021-11-15
Payer: COMMERCIAL

## 2021-11-15 VITALS
BODY MASS INDEX: 36.1 KG/M2 | SYSTOLIC BLOOD PRESSURE: 113 MMHG | RESPIRATION RATE: 16 BRPM | HEIGHT: 67 IN | HEART RATE: 76 BPM | DIASTOLIC BLOOD PRESSURE: 72 MMHG | WEIGHT: 230 LBS | TEMPERATURE: 99 F | OXYGEN SATURATION: 98 %

## 2021-11-15 DIAGNOSIS — E11.49 DIABETES MELLITUS TYPE 2 WITH NEUROLOGICAL MANIFESTATIONS: Chronic | ICD-10-CM

## 2021-11-15 DIAGNOSIS — K58.1 IRRITABLE BOWEL SYNDROME WITH CONSTIPATION: Chronic | ICD-10-CM

## 2021-11-15 DIAGNOSIS — Z12.11 COLON CANCER SCREENING: ICD-10-CM

## 2021-11-15 DIAGNOSIS — E03.8 SUBCLINICAL HYPOTHYROIDISM: Primary | ICD-10-CM

## 2021-11-15 PROCEDURE — 1159F MED LIST DOCD IN RCRD: CPT | Mod: CPTII,S$GLB,, | Performed by: NURSE PRACTITIONER

## 2021-11-15 PROCEDURE — 99214 OFFICE O/P EST MOD 30 MIN: CPT | Mod: S$GLB,,, | Performed by: NURSE PRACTITIONER

## 2021-11-15 PROCEDURE — 3051F PR MOST RECENT HEMOGLOBIN A1C LEVEL 7.0 - < 8.0%: ICD-10-PCS | Mod: CPTII,S$GLB,, | Performed by: NURSE PRACTITIONER

## 2021-11-15 PROCEDURE — 3051F HG A1C>EQUAL 7.0%<8.0%: CPT | Mod: CPTII,S$GLB,, | Performed by: NURSE PRACTITIONER

## 2021-11-15 PROCEDURE — 99214 PR OFFICE/OUTPT VISIT, EST, LEVL IV, 30-39 MIN: ICD-10-PCS | Mod: S$GLB,,, | Performed by: NURSE PRACTITIONER

## 2021-11-15 PROCEDURE — 3078F PR MOST RECENT DIASTOLIC BLOOD PRESSURE < 80 MM HG: ICD-10-PCS | Mod: CPTII,S$GLB,, | Performed by: NURSE PRACTITIONER

## 2021-11-15 PROCEDURE — 3008F PR BODY MASS INDEX (BMI) DOCUMENTED: ICD-10-PCS | Mod: CPTII,S$GLB,, | Performed by: NURSE PRACTITIONER

## 2021-11-15 PROCEDURE — 3074F PR MOST RECENT SYSTOLIC BLOOD PRESSURE < 130 MM HG: ICD-10-PCS | Mod: CPTII,S$GLB,, | Performed by: NURSE PRACTITIONER

## 2021-11-15 PROCEDURE — 3074F SYST BP LT 130 MM HG: CPT | Mod: CPTII,S$GLB,, | Performed by: NURSE PRACTITIONER

## 2021-11-15 PROCEDURE — 3061F NEG MICROALBUMINURIA REV: CPT | Mod: CPTII,S$GLB,, | Performed by: NURSE PRACTITIONER

## 2021-11-15 PROCEDURE — 3066F NEPHROPATHY DOC TX: CPT | Mod: CPTII,S$GLB,, | Performed by: NURSE PRACTITIONER

## 2021-11-15 PROCEDURE — 3066F PR DOCUMENTATION OF TREATMENT FOR NEPHROPATHY: ICD-10-PCS | Mod: CPTII,S$GLB,, | Performed by: NURSE PRACTITIONER

## 2021-11-15 PROCEDURE — 1159F PR MEDICATION LIST DOCUMENTED IN MEDICAL RECORD: ICD-10-PCS | Mod: CPTII,S$GLB,, | Performed by: NURSE PRACTITIONER

## 2021-11-15 PROCEDURE — 3061F PR NEG MICROALBUMINURIA RESULT DOCUMENTED/REVIEW: ICD-10-PCS | Mod: CPTII,S$GLB,, | Performed by: NURSE PRACTITIONER

## 2021-11-15 PROCEDURE — 1160F PR REVIEW ALL MEDS BY PRESCRIBER/CLIN PHARMACIST DOCUMENTED: ICD-10-PCS | Mod: CPTII,S$GLB,, | Performed by: NURSE PRACTITIONER

## 2021-11-15 PROCEDURE — 1160F RVW MEDS BY RX/DR IN RCRD: CPT | Mod: CPTII,S$GLB,, | Performed by: NURSE PRACTITIONER

## 2021-11-15 PROCEDURE — 3008F BODY MASS INDEX DOCD: CPT | Mod: CPTII,S$GLB,, | Performed by: NURSE PRACTITIONER

## 2021-11-15 PROCEDURE — 3078F DIAST BP <80 MM HG: CPT | Mod: CPTII,S$GLB,, | Performed by: NURSE PRACTITIONER

## 2021-11-15 RX ORDER — LEVOTHYROXINE SODIUM 25 UG/1
25 TABLET ORAL
Qty: 30 TABLET | Refills: 11 | Status: SHIPPED | OUTPATIENT
Start: 2021-11-15 | End: 2022-08-08 | Stop reason: SDUPTHER

## 2021-11-29 DIAGNOSIS — K58.1 IRRITABLE BOWEL SYNDROME WITH CONSTIPATION: Primary | ICD-10-CM

## 2022-01-12 ENCOUNTER — TELEPHONE (OUTPATIENT)
Dept: FAMILY MEDICINE | Facility: CLINIC | Age: 57
End: 2022-01-12
Payer: COMMERCIAL

## 2022-01-12 NOTE — TELEPHONE ENCOUNTER
----- Message from Iva Perkins sent at 1/12/2022  8:08 AM CST -----  Margaret Pate has an appointment today @11:20 but she's coming early to get tested for Covid.   She said she refuses to get the nose swab so please give her a call back notifying her how she will be tested. If its the nose swab she wants to cancel her appointment [phone]

## 2022-02-10 ENCOUNTER — TELEPHONE (OUTPATIENT)
Dept: FAMILY MEDICINE | Facility: CLINIC | Age: 57
End: 2022-02-10
Payer: COMMERCIAL

## 2022-02-11 DIAGNOSIS — F43.23 SITUATIONAL MIXED ANXIETY AND DEPRESSIVE DISORDER: ICD-10-CM

## 2022-02-11 RX ORDER — VENLAFAXINE HYDROCHLORIDE 37.5 MG/1
37.5 CAPSULE, EXTENDED RELEASE ORAL DAILY
Qty: 90 CAPSULE | Refills: 3 | Status: SHIPPED | OUTPATIENT
Start: 2022-02-11 | End: 2022-05-31 | Stop reason: SDUPTHER

## 2022-02-11 RX ORDER — VENLAFAXINE HYDROCHLORIDE 37.5 MG/1
37.5 CAPSULE, EXTENDED RELEASE ORAL DAILY
Qty: 90 CAPSULE | Refills: 3 | Status: CANCELLED | OUTPATIENT
Start: 2022-02-11

## 2022-02-11 NOTE — TELEPHONE ENCOUNTER
----- Message from Laura Brewer sent at 2/11/2022  8:09 AM CST -----  Regarding: Advice  Contact: Patient  .Patient would like Pedro to give her a call back at  [phone] concerning her venlafaxine (EFFEXOR-XR) 37.5 MG 24 hr capsule medication.

## 2022-02-12 ENCOUNTER — PATIENT MESSAGE (OUTPATIENT)
Dept: FAMILY MEDICINE | Facility: CLINIC | Age: 57
End: 2022-02-12
Payer: COMMERCIAL

## 2022-02-14 DIAGNOSIS — F34.1 DYSTHYMIA: Primary | ICD-10-CM

## 2022-02-14 RX ORDER — VENLAFAXINE HYDROCHLORIDE 37.5 MG/1
37.5 CAPSULE, EXTENDED RELEASE ORAL DAILY
Qty: 10 CAPSULE | Refills: 0 | Status: SHIPPED | OUTPATIENT
Start: 2022-02-14 | End: 2022-08-08

## 2022-03-24 ENCOUNTER — TELEPHONE (OUTPATIENT)
Dept: GASTROENTEROLOGY | Facility: CLINIC | Age: 57
End: 2022-03-24
Payer: COMMERCIAL

## 2022-03-24 NOTE — TELEPHONE ENCOUNTER
Spoke with patient she states she is severely constipated and has not found anything that works. I suggested an OV to discuss further treatment options with Dr. Joya

## 2022-04-11 DIAGNOSIS — E11.49 DIABETES MELLITUS TYPE 2 WITH NEUROLOGICAL MANIFESTATIONS: Chronic | ICD-10-CM

## 2022-04-11 DIAGNOSIS — E88.819 INSULIN RESISTANCE: Chronic | ICD-10-CM

## 2022-04-11 RX ORDER — DULAGLUTIDE 1.5 MG/.5ML
1.5 INJECTION, SOLUTION SUBCUTANEOUS
Qty: 12 PEN | Refills: 3 | Status: SHIPPED | OUTPATIENT
Start: 2022-04-11 | End: 2022-11-14

## 2022-04-11 NOTE — TELEPHONE ENCOUNTER
----- Message from Romy Richards sent at 4/11/2022  8:34 AM CDT -----  Pt states her last two pens broke - dulaglutide (TRULICITY) 1.5 mg/0.5 mL pen injector    Pharmacy advised her to call office and see if she can get another.       [facility] [place], [place] - [address] [facility]  [address] [facility] [address]  Phone: [phone] Fax: [phone]    Call back is [phone]

## 2022-05-31 DIAGNOSIS — F43.23 SITUATIONAL MIXED ANXIETY AND DEPRESSIVE DISORDER: ICD-10-CM

## 2022-05-31 RX ORDER — VENLAFAXINE HYDROCHLORIDE 37.5 MG/1
37.5 CAPSULE, EXTENDED RELEASE ORAL DAILY
Qty: 90 CAPSULE | Refills: 3 | Status: SHIPPED | OUTPATIENT
Start: 2022-05-31 | End: 2022-08-08

## 2022-05-31 NOTE — TELEPHONE ENCOUNTER
----- Message from Sienna Escobar sent at 5/31/2022  2:24 PM CDT -----  .Type:  RX Refill Request    Who Called: SELF  Refill or New Rx:REFILL  RX Name and Strength:venlafaxine (EFFEXOR-XR) 37.5 MG 24 hr capsule  How is the patient currently taking it? (ex. 1XDay):1/DAY  Is this a 30 day or 90 day RX:90  Preferred Pharmacy with phone number:.  [facility] [address] [place], [place] - [address] [address]  Phone: [phone] Fax: [phone]    Local or Mail Order:LOCAL  Ordering Provider:NARESH  Would the patient rather a call back or a response via [facility]? CALL  Best Call Back Number:.[phone]

## 2022-07-13 ENCOUNTER — TELEPHONE (OUTPATIENT)
Dept: FAMILY MEDICINE | Facility: CLINIC | Age: 57
End: 2022-07-13
Payer: COMMERCIAL

## 2022-07-13 NOTE — TELEPHONE ENCOUNTER
----- Message from Zara Richardson LPN sent at 7/12/2022  4:44 PM CDT -----  Regarding: FW: Sooner Appointment  Contact: patient    ----- Message -----  From: Lissette Dela Cruz  Sent: 7/12/2022   4:39 PM CDT  To: Alyse Chavez Staff  Subject: Sooner Appointment                               Type:  Sooner Apoointment Request    Caller is requesting a sooner appointment.  Caller declined first available appointment listed below.  Caller will not accept being placed on the waitlist and is requesting a message be sent to doctor.  Name of Caller: Margaret   When is the first available appointment? 07/29/2022  Symptoms: right toe nail off   Would the patient rather a call back or a response via [facility]?  Call back   Best Call Back Number: [phone] (home)     Additional Information:       DESTIN Driver

## 2022-07-26 ENCOUNTER — TELEPHONE (OUTPATIENT)
Dept: FAMILY MEDICINE | Facility: CLINIC | Age: 57
End: 2022-07-26
Payer: COMMERCIAL

## 2022-07-26 NOTE — TELEPHONE ENCOUNTER
----- Message from Iva Perkins sent at 7/26/2022  9:29 AM CDT -----  Margaret Pate would like for the nurse or someone in the clinical team to give her a call back regarding her appointments getting mixed up [phone] (home)

## 2022-08-08 ENCOUNTER — OFFICE VISIT (OUTPATIENT)
Dept: FAMILY MEDICINE | Facility: CLINIC | Age: 57
End: 2022-08-08
Payer: COMMERCIAL

## 2022-08-08 ENCOUNTER — PATIENT OUTREACH (OUTPATIENT)
Dept: ADMINISTRATIVE | Facility: HOSPITAL | Age: 57
End: 2022-08-08
Payer: COMMERCIAL

## 2022-08-08 VITALS
HEIGHT: 67 IN | SYSTOLIC BLOOD PRESSURE: 137 MMHG | BODY MASS INDEX: 37.2 KG/M2 | WEIGHT: 237 LBS | RESPIRATION RATE: 18 BRPM | OXYGEN SATURATION: 95 % | HEART RATE: 88 BPM | TEMPERATURE: 97 F | DIASTOLIC BLOOD PRESSURE: 83 MMHG

## 2022-08-08 DIAGNOSIS — E11.49 DIABETES MELLITUS TYPE 2 WITH NEUROLOGICAL MANIFESTATIONS: Primary | Chronic | ICD-10-CM

## 2022-08-08 DIAGNOSIS — E11.42 DIABETIC POLYNEUROPATHY ASSOCIATED WITH TYPE 2 DIABETES MELLITUS: ICD-10-CM

## 2022-08-08 DIAGNOSIS — G47.30 SLEEP APNEA, UNSPECIFIED TYPE: ICD-10-CM

## 2022-08-08 DIAGNOSIS — E78.5 HYPERLIPIDEMIA, UNSPECIFIED HYPERLIPIDEMIA TYPE: ICD-10-CM

## 2022-08-08 DIAGNOSIS — R10.13 EPIGASTRIC BURNING SENSATION: ICD-10-CM

## 2022-08-08 DIAGNOSIS — F34.1 DYSTHYMIA: Chronic | ICD-10-CM

## 2022-08-08 DIAGNOSIS — E03.8 SUBCLINICAL HYPOTHYROIDISM: Chronic | ICD-10-CM

## 2022-08-08 LAB — HBA1C MFR BLD: 9.5 % (ref 4–6)

## 2022-08-08 PROCEDURE — 3046F PR MOST RECENT HEMOGLOBIN A1C LEVEL > 9.0%: ICD-10-PCS | Mod: CPTII,S$GLB,, | Performed by: NURSE PRACTITIONER

## 2022-08-08 PROCEDURE — 1160F RVW MEDS BY RX/DR IN RCRD: CPT | Mod: CPTII,S$GLB,, | Performed by: NURSE PRACTITIONER

## 2022-08-08 PROCEDURE — 3075F PR MOST RECENT SYSTOLIC BLOOD PRESS GE 130-139MM HG: ICD-10-PCS | Mod: CPTII,S$GLB,, | Performed by: NURSE PRACTITIONER

## 2022-08-08 PROCEDURE — 3008F BODY MASS INDEX DOCD: CPT | Mod: CPTII,S$GLB,, | Performed by: NURSE PRACTITIONER

## 2022-08-08 PROCEDURE — 3075F SYST BP GE 130 - 139MM HG: CPT | Mod: CPTII,S$GLB,, | Performed by: NURSE PRACTITIONER

## 2022-08-08 PROCEDURE — 1159F MED LIST DOCD IN RCRD: CPT | Mod: CPTII,S$GLB,, | Performed by: NURSE PRACTITIONER

## 2022-08-08 PROCEDURE — 3079F DIAST BP 80-89 MM HG: CPT | Mod: CPTII,S$GLB,, | Performed by: NURSE PRACTITIONER

## 2022-08-08 PROCEDURE — 3079F PR MOST RECENT DIASTOLIC BLOOD PRESSURE 80-89 MM HG: ICD-10-PCS | Mod: CPTII,S$GLB,, | Performed by: NURSE PRACTITIONER

## 2022-08-08 PROCEDURE — 1160F PR REVIEW ALL MEDS BY PRESCRIBER/CLIN PHARMACIST DOCUMENTED: ICD-10-PCS | Mod: CPTII,S$GLB,, | Performed by: NURSE PRACTITIONER

## 2022-08-08 PROCEDURE — 3046F HEMOGLOBIN A1C LEVEL >9.0%: CPT | Mod: CPTII,S$GLB,, | Performed by: NURSE PRACTITIONER

## 2022-08-08 PROCEDURE — 83036 PR  GLYCOSYLATED HEMOGLOBIN TEST: ICD-10-PCS | Mod: QW,S$GLB,, | Performed by: NURSE PRACTITIONER

## 2022-08-08 PROCEDURE — 99214 OFFICE O/P EST MOD 30 MIN: CPT | Mod: 25,S$GLB,, | Performed by: NURSE PRACTITIONER

## 2022-08-08 PROCEDURE — 99214 PR OFFICE/OUTPT VISIT, EST, LEVL IV, 30-39 MIN: ICD-10-PCS | Mod: 25,S$GLB,, | Performed by: NURSE PRACTITIONER

## 2022-08-08 PROCEDURE — 83036 HEMOGLOBIN GLYCOSYLATED A1C: CPT | Mod: QW,S$GLB,, | Performed by: NURSE PRACTITIONER

## 2022-08-08 PROCEDURE — 3008F PR BODY MASS INDEX (BMI) DOCUMENTED: ICD-10-PCS | Mod: CPTII,S$GLB,, | Performed by: NURSE PRACTITIONER

## 2022-08-08 PROCEDURE — 1159F PR MEDICATION LIST DOCUMENTED IN MEDICAL RECORD: ICD-10-PCS | Mod: CPTII,S$GLB,, | Performed by: NURSE PRACTITIONER

## 2022-08-08 RX ORDER — VENLAFAXINE HYDROCHLORIDE 75 MG/1
75 CAPSULE, EXTENDED RELEASE ORAL DAILY
Qty: 90 CAPSULE | Refills: 1 | Status: SHIPPED | OUTPATIENT
Start: 2022-08-08 | End: 2023-05-01

## 2022-08-08 RX ORDER — DULOXETINE 40 MG/1
1 CAPSULE, DELAYED RELEASE ORAL DAILY
COMMUNITY
Start: 2022-07-26 | End: 2022-08-08

## 2022-08-08 RX ORDER — ATOMOXETINE 25 MG/1
25 CAPSULE ORAL DAILY
COMMUNITY
Start: 2022-07-31 | End: 2022-08-08

## 2022-08-08 RX ORDER — ATORVASTATIN CALCIUM 40 MG/1
40 TABLET, FILM COATED ORAL DAILY
Qty: 90 TABLET | Refills: 1 | Status: SHIPPED | OUTPATIENT
Start: 2022-08-08 | End: 2023-05-31

## 2022-08-08 RX ORDER — GLIPIZIDE 10 MG/1
10 TABLET ORAL 2 TIMES DAILY WITH MEALS
Qty: 180 TABLET | Refills: 1 | Status: SHIPPED | OUTPATIENT
Start: 2022-08-08 | End: 2023-02-28

## 2022-08-08 RX ORDER — PIOGLITAZONEHYDROCHLORIDE 45 MG/1
45 TABLET ORAL DAILY
Qty: 90 TABLET | Refills: 1 | Status: SHIPPED | OUTPATIENT
Start: 2022-08-08 | End: 2023-03-17

## 2022-08-08 RX ORDER — PANTOPRAZOLE SODIUM 40 MG/1
40 TABLET, DELAYED RELEASE ORAL DAILY
Qty: 30 TABLET | Refills: 0 | Status: SHIPPED | OUTPATIENT
Start: 2022-08-08 | End: 2022-09-21

## 2022-08-08 RX ORDER — GABAPENTIN 300 MG/1
300 CAPSULE ORAL NIGHTLY
Qty: 90 CAPSULE | Refills: 1 | Status: SHIPPED | OUTPATIENT
Start: 2022-08-08 | End: 2023-02-28

## 2022-08-08 RX ORDER — LEVOTHYROXINE SODIUM 25 UG/1
25 TABLET ORAL
Qty: 90 TABLET | Refills: 1 | Status: SHIPPED | OUTPATIENT
Start: 2022-08-08 | End: 2023-02-28

## 2022-08-08 NOTE — PROGRESS NOTES
Subjective:       Patient ID: Margaret Pate is a 57 y.o. female.    Chief Complaint: Follow-up (Pt is here for a routine 6 month follow up and medication refill. Pt also has some stomach pain. Pt states it's like a burning pain in her upper abdomen that's make her nauseous. Pt wants to discuss a dosage increase on her venlafaxine.)    F/u DM2. Her last A1c 7.8%, today her A1c is 9.5%. She admits that she has been laxed on her diabetic diet.     Burning epigastric pain for the past several months. Some nausea, but no vomiting. Occurs after eating or on an empty stomach. She is often constipated, but she has a long hx of IBS, constipation predominant. She is scheduled for GI eval next month for upcoming colonoscopy.     Worsening depression symptoms, trigger unknown. She [facility] to [facility]. They stopped venlafaxine and started her on duloxetine and Straterra. No prior hx of ADHD as adult or in childhood. She stopped the medications. She is interested in venlafaxine with dose adjustment.     Review of Systems   Constitutional: Negative for activity change, appetite change and fever.   Respiratory: Negative for chest tightness and shortness of breath.    Cardiovascular: Negative for chest pain and palpitations.   Gastrointestinal: Positive for constipation. Negative for abdominal pain, diarrhea, nausea and vomiting.   Neurological: Negative for dizziness, weakness, light-headedness and headaches.   Psychiatric/Behavioral: Positive for dysphoric mood. The patient is nervous/anxious.            Past Medical History:  Past Medical History:   Diagnosis Date    DM (diabetes mellitus)     TYPE 2    Elevated blood-pressure reading without diagnosis of hypertension     Hypothyroidism     Perimenopausal disorder       Past Surgical History:   Procedure Laterality Date     SECTION      x 2      Review of patient's allergies indicates:  No Known Allergies   Current Outpatient Medications   Medication Sig Dispense Refill     blood sugar diagnostic Strp To check BG one times daily, to use with insurance preferred meter 30 strip 6    dulaglutide (TRULICITY) 1.5 mg/0.5 mL pen injector Inject 1.5 mg into the skin every 7 days. 12 pen 3    lancets Misc To check BG one times daily, to use with insurance preferred meter 30 each 6    TRUEPLUS LANCETS 33 gauge Misc USE 1 LANCET TO CHECK GLUCOSE ONCE DAILY      atorvastatin (LIPITOR) 40 MG tablet Take 1 tablet (40 mg total) by mouth once daily. 90 tablet 1    gabapentin (NEURONTIN) 300 MG capsule Take 1 capsule (300 mg total) by mouth every evening. 90 capsule 1    glipiZIDE (GLUCOTROL) 10 MG tablet Take 1 tablet (10 mg total) by mouth 2 (two) times daily with meals. 180 tablet 1    levothyroxine (SYNTHROID) 25 MCG tablet Take 1 tablet (25 mcg total) by mouth before breakfast. 90 tablet 1    pantoprazole (PROTONIX) 40 MG tablet Take 1 tablet (40 mg total) by mouth once daily. 30 tablet 0    pioglitazone (ACTOS) 45 MG tablet Take 1 tablet (45 mg total) by mouth once daily. 90 tablet 1    venlafaxine (EFFEXOR-XR) 75 MG 24 hr capsule Take 1 capsule (75 mg total) by mouth once daily. 90 capsule 1     No current facility-administered medications for this visit.     Social History     Socioeconomic History    Marital status:    Tobacco Use    Smoking status: Former Smoker    Smokeless tobacco: Never Used   Substance and Sexual Activity    Alcohol use: Never    Drug use: Never      Family History   Problem Relation Age of Onset    Diabetes Mother     Hypertension Mother     Diabetes Father     Colon cancer Father     Stroke Sister         Objective:      Physical Exam  Constitutional:       Appearance: She is well-developed. She is obese.   HENT:      Head: Normocephalic and atraumatic.   Eyes:      General: No scleral icterus.     Conjunctiva/sclera: Conjunctivae normal.      Pupils: Pupils are equal, round, and reactive to light.   Neck:      Thyroid: No thyroid mass.       Vascular: No carotid bruit.      Trachea: Trachea normal.   Cardiovascular:      Rate and Rhythm: Normal rate and regular rhythm.   Pulmonary:      Effort: Pulmonary effort is normal.      Breath sounds: Normal breath sounds.   Musculoskeletal:      Cervical back: Normal range of motion and neck supple.   Neurological:      Mental Status: She is alert and oriented to person, place, and time.   Psychiatric:         Mood and Affect: Mood normal.         Behavior: Behavior normal.         Assessment:     1. Diabetes mellitus type 2 with neurological manifestations Poorly controlled   2. Subclinical hypothyroidism Active   3. Epigastric burning sensation Active   4. Dysthymia Active   5. Hyperlipidemia, unspecified hyperlipidemia type    6. Sleep apnea, unspecified type    7. Diabetic polyneuropathy associated with type 2 diabetes mellitus      Plan:       PROBLEM LIST     Diabetes mellitus type 2 with neurological manifestations  Comments:  A1c 9.1%; start glipizide. Adhere to ADA diet; RTC in 12 weeks for repeat A1c.   Orders:  -     gabapentin (NEURONTIN) 300 MG capsule; Take 1 capsule (300 mg total) by mouth every evening.  Dispense: 90 capsule; Refill: 1  -     pioglitazone (ACTOS) 45 MG tablet; Take 1 tablet (45 mg total) by mouth once daily.  Dispense: 90 tablet; Refill: 1  -     glipiZIDE (GLUCOTROL) 10 MG tablet; Take 1 tablet (10 mg total) by mouth 2 (two) times daily with meals.  Dispense: 180 tablet; Refill: 1  -     Hemoglobin A1C    Subclinical hypothyroidism  Comments:  repeat thyroid panel next appt    Orders:  -     levothyroxine (SYNTHROID) 25 MCG tablet; Take 1 tablet (25 mcg total) by mouth before breakfast.  Dispense: 90 tablet; Refill: 1    Epigastric burning sensation  Comments:  start Protonix x 4 weeks; Keep upcoming appt with GI; avoid OTC aspirin/NSAID products.   Orders:  -     pantoprazole (PROTONIX) 40 MG tablet; Take 1 tablet (40 mg total) by mouth once daily.  Dispense: 30 tablet;  Refill: 0    Dysthymia  Comments:  adjusting venlafaxine to 75 mg; RTC in 12 weeks for f/u    Hyperlipidemia, unspecified hyperlipidemia type  -     atorvastatin (LIPITOR) 40 MG tablet; Take 1 tablet (40 mg total) by mouth once daily.  Dispense: 90 tablet; Refill: 1    Sleep apnea, unspecified type    Diabetic polyneuropathy associated with type 2 diabetes mellitus    Other orders  -     venlafaxine (EFFEXOR-XR) 75 MG 24 hr capsule; Take 1 capsule (75 mg total) by mouth once daily.  Dispense: 90 capsule; Refill: 1

## 2022-08-16 ENCOUNTER — PATIENT OUTREACH (OUTPATIENT)
Dept: ADMINISTRATIVE | Facility: HOSPITAL | Age: 57
End: 2022-08-16
Payer: COMMERCIAL

## 2022-08-16 NOTE — PROGRESS NOTES
LC Colon non -compliant: No Colonoscopy result found in [employer], [place] and [facility] Everywhere or media.

## 2022-09-06 NOTE — PROGRESS NOTES
Clinic Note    Reason for visit:  The primary encounter diagnosis was Chronic idiopathic constipation. Diagnoses of Nausea, Family history of colon cancer, and Screening for colon cancer were also pertinent to this visit.    PCP: Kathy Cullen       HPI:  This is a 57 y.o. female who is here to establish care. Patient with chronic constipation her whole life. Patient reports BM every 2-3 weeks. She denies any abdominal pain associated with constipation. She has tried Linzess 290 in past for one month and did not help. She took MiraLax 1 capful daily, and after 7 days she had a BM but states it was loose, lasted about 4 days, and was difficult to keep herself clean and had a couple episodes of incontinence during that time. She would stop the Miralax after having the BM for 4 days. Denies any blood in stool. No prior colonoscopy. She states when having severe constipation, she will have nausea associated. FH of colon cancer in father.         Review of Systems   Constitutional:  Negative for chills, diaphoresis, fatigue, fever and unexpected weight change.   HENT:  Negative for mouth sores, nosebleeds, postnasal drip, sore throat, trouble swallowing and voice change.    Eyes:  Negative for pain, discharge and eye dryness.   Respiratory:  Negative for apnea, cough, choking, chest tightness, shortness of breath and wheezing.    Cardiovascular:  Negative for chest pain, palpitations, leg swelling and claudication.   Gastrointestinal:  Positive for constipation. Negative for abdominal distention, abdominal pain, anal bleeding, blood in stool, change in bowel habit, diarrhea, nausea, rectal pain, vomiting, reflux, fecal incontinence and change in bowel habit.   Genitourinary:  Negative for bladder incontinence, difficulty urinating, dysuria, flank pain, frequency and hematuria.   Musculoskeletal:  Negative for arthralgias, back pain, joint swelling and joint deformity.   Integumentary:  Negative for color change, rash  and wound.   Allergic/Immunologic: Negative for environmental allergies and food allergies.   Neurological:  Negative for seizures, facial asymmetry, speech difficulty, weakness, headaches and memory loss.   Hematological:  Negative for adenopathy. Does not bruise/bleed easily.   Psychiatric/Behavioral:  Negative for agitation, behavioral problems, confusion, hallucinations and sleep disturbance.       Past Medical History:   Diagnosis Date    BMI 37.0-37.9, adult     DM (diabetes mellitus)     TYPE 2    Elevated blood-pressure reading without diagnosis of hypertension     Hypothyroidism     Perimenopausal disorder      Past Surgical History:   Procedure Laterality Date     SECTION      x 2    LAPAROSCOPIC GASTRIC BANDING       Family History   Problem Relation Age of Onset    Diabetes Mother     Hypertension Mother     Diabetes Father     Colon cancer Father     Stroke Sister      Social History     Tobacco Use    Smoking status: Former    Smokeless tobacco: Never   Substance Use Topics    Alcohol use: Never    Drug use: Never     Review of patient's allergies indicates:  No Known Allergies     Medication List with Changes/Refills   New Medications    ONDANSETRON (ZOFRAN-ODT) 4 MG TBDL    Take 1 tablet (4 mg total) by mouth every 8 (eight) hours as needed (Nausea).    SOD SULF-POT CHLORIDE-MAG SULF (SUTAB) 1.479-0.188- 0.225 GRAM TABLET    Take 12 tablets by mouth once daily. Take according to package instructions with indicated amount of water. No breakfast day before test. May substitute with Suprep, Clenpiq, Plenvu, Moviprep or GoLytely based on Rx plan and patient preference.   Current Medications    ATORVASTATIN (LIPITOR) 40 MG TABLET    Take 1 tablet (40 mg total) by mouth once daily.    BLOOD SUGAR DIAGNOSTIC STRP    To check BG one times daily, to use with insurance preferred meter    DULAGLUTIDE (TRULICITY) 1.5 MG/0.5 ML PEN INJECTOR    Inject 1.5 mg into the skin every 7 days.    GABAPENTIN  "(NEURONTIN) 300 MG CAPSULE    Take 1 capsule (300 mg total) by mouth every evening.    GLIPIZIDE (GLUCOTROL) 10 MG TABLET    Take 1 tablet (10 mg total) by mouth 2 (two) times daily with meals.    LANCETS MISC    To check BG one times daily, to use with insurance preferred meter    LEVOTHYROXINE (SYNTHROID) 25 MCG TABLET    Take 1 tablet (25 mcg total) by mouth before breakfast.    PANTOPRAZOLE (PROTONIX) 40 MG TABLET    Take 1 tablet (40 mg total) by mouth once daily.    PIOGLITAZONE (ACTOS) 45 MG TABLET    Take 1 tablet (45 mg total) by mouth once daily.    TRUEPLUS LANCETS 33 GAUGE MISC    USE 1 LANCET TO CHECK GLUCOSE ONCE DAILY    VENLAFAXINE (EFFEXOR-XR) 75 MG 24 HR CAPSULE    Take 1 capsule (75 mg total) by mouth once daily.         Vital Signs:  /72   Pulse 73   Ht 5' 7" (1.702 m)   Wt 107.5 kg (237 lb)   BMI 37.12 kg/m²         Physical Exam  Vitals reviewed.   Constitutional:       General: She is awake. She is not in acute distress.     Appearance: Normal appearance. She is well-developed. She is obese. She is not ill-appearing, toxic-appearing or diaphoretic.   HENT:      Head: Normocephalic and atraumatic.      Nose: Nose normal.      Mouth/Throat:      Mouth: Mucous membranes are moist.      Pharynx: Oropharynx is clear. No oropharyngeal exudate or posterior oropharyngeal erythema.   Eyes:      General: Lids are normal. Gaze aligned appropriately. No scleral icterus.        Right eye: No discharge.         Left eye: No discharge.      Extraocular Movements: Extraocular movements intact.      Conjunctiva/sclera: Conjunctivae normal.   Neck:      Trachea: Trachea normal.   Cardiovascular:      Rate and Rhythm: Normal rate and regular rhythm.      Pulses:           Radial pulses are 2+ on the right side and 2+ on the left side.   Pulmonary:      Effort: Pulmonary effort is normal. No respiratory distress.      Breath sounds: Normal breath sounds. No stridor. No wheezing or rhonchi.   Chest:      " Chest wall: No tenderness.   Abdominal:      General: Bowel sounds are normal. There is no distension.      Palpations: Abdomen is soft. There is no fluid wave, hepatomegaly or mass.      Tenderness: There is no abdominal tenderness. There is no guarding or rebound.   Musculoskeletal:         General: No tenderness or deformity.      Cervical back: Full passive range of motion without pain and neck supple. No tenderness.      Right lower leg: No edema.      Left lower leg: No edema.   Lymphadenopathy:      Cervical: No cervical adenopathy.   Skin:     General: Skin is warm and dry.      Capillary Refill: Capillary refill takes less than 2 seconds.      Coloration: Skin is not cyanotic, jaundiced or pale.      Findings: No rash.   Neurological:      General: No focal deficit present.      Mental Status: She is alert and oriented to person, place, and time.      Cranial Nerves: No facial asymmetry.      Motor: No tremor.   Psychiatric:         Attention and Perception: Attention normal.         Mood and Affect: Mood and affect normal.         Speech: Speech normal.         Behavior: Behavior normal. Behavior is cooperative.          All of the data above and below has been reviewed by myself and any further interpretations will be reflected in the assessment and plan.   The data includes review of external notes, and independent interpretation of lab results, procedures, x-rays, and imaging reports.      Assessment:  Chronic idiopathic constipation    Nausea  -     ondansetron (ZOFRAN-ODT) 4 MG TbDL; Take 1 tablet (4 mg total) by mouth every 8 (eight) hours as needed (Nausea).  Dispense: 21 tablet; Refill: 0    Family history of colon cancer  -     Ambulatory Referral to External Surgery    Screening for colon cancer  -     Ambulatory Referral to External Surgery    Other orders  -     sod sulf-pot chloride-mag sulf (SUTAB) 1.479-0.188- 0.225 gram tablet; Take 12 tablets by mouth once daily. Take according to package  instructions with indicated amount of water. No breakfast day before test. May substitute with Suprep, Clenpiq, Plenvu, Moviprep or GoLytely based on Rx plan and patient preference.  Dispense: 24 tablet; Refill: 0    Constipation: titration of Miralax to establish good bowel regimen. Failed Linzess. If Miralax does not work well, may consider Trulance.  No prior colonoscopy: FH of colon cancer in father  Nausea associated with constipation.    11/2021 CBC/CMP/TSH nl. Likely slow GI transit. Structural source will be ruled out with colonoscopy.    Recommendations:  Schedule colonoscopy. May do 2 days of clear liquids prior to procedure and increase Miralax to twice daily for 3 days prior to procedure. Hold glipizide the day before procedure.   Begin taking MiraLAX. Start with 1/2 capful daily, and titrate dose up or down until you are having daily, soft bowel movements.  May take Zofran as needed for nausea.       Risks, benefits, and alternatives of medical management, any associated procedures, and/or treatment discussed with the patient. Patient given opportunity to ask questions and voices understanding. Patient has elected to proceed with the recommended care modalities as discussed.    Follow up in about 6 months (around 3/7/2023).    Order summary:  Orders Placed This Encounter   Procedures    Ambulatory Referral to External Surgery          Instructed patient to notify my office if they have not been contacted within two weeks after any procedures, submitting any samples (biopsies, blood, stool, urine, etc.) or after any imaging (X-ray, CT, MRI, etc.).     Jesi Peña MD    This document may have been created using a voice recognition transcribing system. Incorrect words or phrases may have been missed during proofreading. Please interpret accordingly or contact me for clarification.

## 2022-09-07 ENCOUNTER — OFFICE VISIT (OUTPATIENT)
Dept: GASTROENTEROLOGY | Facility: CLINIC | Age: 57
End: 2022-09-07
Payer: COMMERCIAL

## 2022-09-07 VITALS
HEART RATE: 73 BPM | DIASTOLIC BLOOD PRESSURE: 72 MMHG | WEIGHT: 237 LBS | HEIGHT: 67 IN | BODY MASS INDEX: 37.2 KG/M2 | SYSTOLIC BLOOD PRESSURE: 126 MMHG

## 2022-09-07 DIAGNOSIS — Z80.0 FAMILY HISTORY OF COLON CANCER: ICD-10-CM

## 2022-09-07 DIAGNOSIS — K59.04 CHRONIC IDIOPATHIC CONSTIPATION: Primary | ICD-10-CM

## 2022-09-07 DIAGNOSIS — R11.0 NAUSEA: ICD-10-CM

## 2022-09-07 DIAGNOSIS — Z12.11 SCREENING FOR COLON CANCER: ICD-10-CM

## 2022-09-07 PROCEDURE — 1159F PR MEDICATION LIST DOCUMENTED IN MEDICAL RECORD: ICD-10-PCS | Mod: CPTII,S$GLB,, | Performed by: INTERNAL MEDICINE

## 2022-09-07 PROCEDURE — 3078F DIAST BP <80 MM HG: CPT | Mod: CPTII,S$GLB,, | Performed by: INTERNAL MEDICINE

## 2022-09-07 PROCEDURE — 3078F PR MOST RECENT DIASTOLIC BLOOD PRESSURE < 80 MM HG: ICD-10-PCS | Mod: CPTII,S$GLB,, | Performed by: INTERNAL MEDICINE

## 2022-09-07 PROCEDURE — 3074F PR MOST RECENT SYSTOLIC BLOOD PRESSURE < 130 MM HG: ICD-10-PCS | Mod: CPTII,S$GLB,, | Performed by: INTERNAL MEDICINE

## 2022-09-07 PROCEDURE — 3046F HEMOGLOBIN A1C LEVEL >9.0%: CPT | Mod: CPTII,S$GLB,, | Performed by: INTERNAL MEDICINE

## 2022-09-07 PROCEDURE — 3008F PR BODY MASS INDEX (BMI) DOCUMENTED: ICD-10-PCS | Mod: CPTII,S$GLB,, | Performed by: INTERNAL MEDICINE

## 2022-09-07 PROCEDURE — 1160F PR REVIEW ALL MEDS BY PRESCRIBER/CLIN PHARMACIST DOCUMENTED: ICD-10-PCS | Mod: CPTII,S$GLB,, | Performed by: INTERNAL MEDICINE

## 2022-09-07 PROCEDURE — 1160F RVW MEDS BY RX/DR IN RCRD: CPT | Mod: CPTII,S$GLB,, | Performed by: INTERNAL MEDICINE

## 2022-09-07 PROCEDURE — 99203 OFFICE O/P NEW LOW 30 MIN: CPT | Mod: S$GLB,,, | Performed by: INTERNAL MEDICINE

## 2022-09-07 PROCEDURE — 3008F BODY MASS INDEX DOCD: CPT | Mod: CPTII,S$GLB,, | Performed by: INTERNAL MEDICINE

## 2022-09-07 PROCEDURE — 3046F PR MOST RECENT HEMOGLOBIN A1C LEVEL > 9.0%: ICD-10-PCS | Mod: CPTII,S$GLB,, | Performed by: INTERNAL MEDICINE

## 2022-09-07 PROCEDURE — 1159F MED LIST DOCD IN RCRD: CPT | Mod: CPTII,S$GLB,, | Performed by: INTERNAL MEDICINE

## 2022-09-07 PROCEDURE — 3074F SYST BP LT 130 MM HG: CPT | Mod: CPTII,S$GLB,, | Performed by: INTERNAL MEDICINE

## 2022-09-07 PROCEDURE — 99203 PR OFFICE/OUTPT VISIT, NEW, LEVL III, 30-44 MIN: ICD-10-PCS | Mod: S$GLB,,, | Performed by: INTERNAL MEDICINE

## 2022-09-07 RX ORDER — ONDANSETRON 4 MG/1
4 TABLET, ORALLY DISINTEGRATING ORAL EVERY 8 HOURS PRN
Qty: 21 TABLET | Refills: 0 | Status: SHIPPED | OUTPATIENT
Start: 2022-09-07 | End: 2023-06-29

## 2022-09-07 RX ORDER — SOD SULF/POT CHLORIDE/MAG SULF 1.479 G
12 TABLET ORAL DAILY
Qty: 24 TABLET | Refills: 0 | Status: SHIPPED | OUTPATIENT
Start: 2022-09-07 | End: 2023-03-20

## 2022-09-07 NOTE — PATIENT INSTRUCTIONS
Schedule colonoscopy. May do 2 days of clear liquids prior to procedure and increase Miralax to twice daily for 3 days prior to procedure. Hold glipizide the day before procedure.   Begin taking MiraLAX. Start with 1/2 capful daily, and titrate dose up or down until you are having daily, soft bowel movements.  May take Zofran as needed for nausea.     Please notify my office if you have not been contacted within two weeks after any procedures, submitting any samples (biopsies, blood, stool, urine, etc.) or after any imaging (X-ray, CT, MRI, etc.).

## 2022-09-12 ENCOUNTER — TELEPHONE (OUTPATIENT)
Dept: FAMILY MEDICINE | Facility: CLINIC | Age: 57
End: 2022-09-12
Payer: COMMERCIAL

## 2022-09-12 NOTE — TELEPHONE ENCOUNTER
----- Message from Adore Beverly sent at 9/12/2022 11:01 AM CDT -----  Type:  Needs Medical Advice    Who Called: Margaret Pate    Symptoms (please be specific):  Back pain    How long has patient had these symptoms:  -  Pharmacy name and phone #:  -  Would the patient rather a call back or a response via [facility]? -    Best Call Back Number: [phone]  Additional Information:  pt needs a referral to a back specialist, please call

## 2022-09-12 NOTE — TELEPHONE ENCOUNTER
----- Message from Adore Beverly sent at 9/12/2022 11:01 AM CDT -----  Type:  Needs Medical Advice    Who Called: Margaret Pate    Symptoms (please be specific):  Back pain    How long has patient had these symptoms:  -  Pharmacy name and phone #:  -  Would the patient rather a call back or a response via [facility]? -    Best Call Back Number: [phone]  Additional Information:  pt needs a referral to a back specialist, please call

## 2022-09-13 ENCOUNTER — OFFICE VISIT (OUTPATIENT)
Dept: FAMILY MEDICINE | Facility: CLINIC | Age: 57
End: 2022-09-13
Payer: COMMERCIAL

## 2022-09-13 VITALS
BODY MASS INDEX: 37.04 KG/M2 | TEMPERATURE: 97 F | RESPIRATION RATE: 18 BRPM | DIASTOLIC BLOOD PRESSURE: 73 MMHG | HEIGHT: 67 IN | SYSTOLIC BLOOD PRESSURE: 110 MMHG | HEART RATE: 75 BPM | WEIGHT: 236 LBS | OXYGEN SATURATION: 97 %

## 2022-09-13 DIAGNOSIS — E66.01 SEVERE OBESITY (BMI 35.0-39.9) WITH COMORBIDITY: Chronic | ICD-10-CM

## 2022-09-13 DIAGNOSIS — M54.50 ACUTE MIDLINE LOW BACK PAIN WITHOUT SCIATICA: Primary | ICD-10-CM

## 2022-09-13 PROCEDURE — 3046F PR MOST RECENT HEMOGLOBIN A1C LEVEL > 9.0%: ICD-10-PCS | Mod: CPTII,S$GLB,, | Performed by: NURSE PRACTITIONER

## 2022-09-13 PROCEDURE — 99213 OFFICE O/P EST LOW 20 MIN: CPT | Mod: S$GLB,,, | Performed by: NURSE PRACTITIONER

## 2022-09-13 PROCEDURE — 1159F MED LIST DOCD IN RCRD: CPT | Mod: CPTII,S$GLB,, | Performed by: NURSE PRACTITIONER

## 2022-09-13 PROCEDURE — 3008F PR BODY MASS INDEX (BMI) DOCUMENTED: ICD-10-PCS | Mod: CPTII,S$GLB,, | Performed by: NURSE PRACTITIONER

## 2022-09-13 PROCEDURE — 3074F SYST BP LT 130 MM HG: CPT | Mod: CPTII,S$GLB,, | Performed by: NURSE PRACTITIONER

## 2022-09-13 PROCEDURE — 3078F PR MOST RECENT DIASTOLIC BLOOD PRESSURE < 80 MM HG: ICD-10-PCS | Mod: CPTII,S$GLB,, | Performed by: NURSE PRACTITIONER

## 2022-09-13 PROCEDURE — 3046F HEMOGLOBIN A1C LEVEL >9.0%: CPT | Mod: CPTII,S$GLB,, | Performed by: NURSE PRACTITIONER

## 2022-09-13 PROCEDURE — 3078F DIAST BP <80 MM HG: CPT | Mod: CPTII,S$GLB,, | Performed by: NURSE PRACTITIONER

## 2022-09-13 PROCEDURE — 3074F PR MOST RECENT SYSTOLIC BLOOD PRESSURE < 130 MM HG: ICD-10-PCS | Mod: CPTII,S$GLB,, | Performed by: NURSE PRACTITIONER

## 2022-09-13 PROCEDURE — 99213 PR OFFICE/OUTPT VISIT, EST, LEVL III, 20-29 MIN: ICD-10-PCS | Mod: S$GLB,,, | Performed by: NURSE PRACTITIONER

## 2022-09-13 PROCEDURE — 1159F PR MEDICATION LIST DOCUMENTED IN MEDICAL RECORD: ICD-10-PCS | Mod: CPTII,S$GLB,, | Performed by: NURSE PRACTITIONER

## 2022-09-13 PROCEDURE — 3008F BODY MASS INDEX DOCD: CPT | Mod: CPTII,S$GLB,, | Performed by: NURSE PRACTITIONER

## 2022-09-13 RX ORDER — METHOCARBAMOL 750 MG/1
750 TABLET, FILM COATED ORAL 3 TIMES DAILY PRN
Qty: 90 TABLET | Refills: 1 | Status: SHIPPED | OUTPATIENT
Start: 2022-09-13 | End: 2023-03-20

## 2022-09-13 RX ORDER — MELOXICAM 15 MG/1
15 TABLET ORAL DAILY
Qty: 30 TABLET | Refills: 1 | Status: SHIPPED | OUTPATIENT
Start: 2022-09-13 | End: 2023-02-28
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
## 2022-09-19 DIAGNOSIS — M54.50 ACUTE MIDLINE LOW BACK PAIN WITHOUT SCIATICA: Primary | ICD-10-CM

## 2022-09-19 RX ORDER — LIDOCAINE 50 MG/G
1 PATCH TOPICAL DAILY
Qty: 30 PATCH | Refills: 2 | Status: SHIPPED | OUTPATIENT
Start: 2022-09-19

## 2022-11-10 ENCOUNTER — OUTSIDE PLACE OF SERVICE (OUTPATIENT)
Dept: GASTROENTEROLOGY | Facility: CLINIC | Age: 57
End: 2022-11-10

## 2022-11-10 ENCOUNTER — TELEPHONE (OUTPATIENT)
Dept: GASTROENTEROLOGY | Facility: CLINIC | Age: 57
End: 2022-11-10

## 2022-11-10 DIAGNOSIS — K63.89 MASS OF COLON: Primary | ICD-10-CM

## 2022-11-10 LAB — CRC RECOMMENDATION EXT: NORMAL

## 2022-11-10 PROCEDURE — 45381 PR COLONOSCPY,FLEX,W/DIR SUBMUC INJECT: ICD-10-PCS | Mod: 51,,, | Performed by: INTERNAL MEDICINE

## 2022-11-10 PROCEDURE — 45380 PR COLONOSCOPY,BIOPSY: ICD-10-PCS | Mod: 33,,, | Performed by: INTERNAL MEDICINE

## 2022-11-10 PROCEDURE — 45380 COLONOSCOPY AND BIOPSY: CPT | Mod: 33,,, | Performed by: INTERNAL MEDICINE

## 2022-11-10 PROCEDURE — 45381 COLONOSCOPY SUBMUCOUS NJX: CPT | Mod: 51,,, | Performed by: INTERNAL MEDICINE

## 2022-11-10 NOTE — TELEPHONE ENCOUNTER
Patient had procedure this morning with Dr. Peña and needs to have labs completed. Notify patient that she should go to the path lab to do these labs and that we will be ordering a CT scan and someone from [facility] will call to schedule this.   MLC

## 2022-11-15 ENCOUNTER — TELEPHONE (OUTPATIENT)
Dept: GASTROENTEROLOGY | Facility: CLINIC | Age: 57
End: 2022-11-15
Payer: COMMERCIAL

## 2022-11-15 NOTE — TELEPHONE ENCOUNTER
TC mass Bx: adenoCA.    I reviewed the pathology results with the patient directly over the phone.  She has not had a chance to have a blood work drawn.  Her prior creatinine November 2021 was normal.  CT scan prior authorization confirmed. The telephone number to scheduling was provided to patient.  [phone].  She will contact them to schedule her CT scan.  She would like referral to Dr. Chris Carter in [place] if surgery is the next step otherwise we will place local oncology referral.  NBP

## 2022-11-16 ENCOUNTER — PATIENT MESSAGE (OUTPATIENT)
Dept: ADMINISTRATIVE | Facility: HOSPITAL | Age: 57
End: 2022-11-16
Payer: COMMERCIAL

## 2022-11-16 NOTE — TELEPHONE ENCOUNTER
Called patient back and told to let us know what lab she would like us to send orders to. Patient stated she would go back to path lab was just frustrated because she owed them money and they wanted her to pay before labs could be drawn. Explained again if wanted to tell us where she would like us to send labs we would. Patient stated would go to path lab.

## 2022-11-16 NOTE — TELEPHONE ENCOUNTER
----- Message from Chinyere Gerard sent at 11/16/2022  9:04 AM CST -----  Contact: Patient  Patient called to consult with nurse or staff regarding her orders. She states she would like the orders sent somewhere in [place] if possible. She states the path lab is giving her a hard time and would like a call back. She can be reached at [phone]. Thanks/

## 2022-11-17 LAB
ABS NRBC COUNT: 0 X 10 3/UL (ref 0–0.01)
ABSOLUTE BASOPHIL: 0.02 X 10 3/UL (ref 0–0.22)
ABSOLUTE EOSINOPHIL: 0.15 X 10 3/UL (ref 0.04–0.54)
ABSOLUTE IMMATURE GRAN: 0.04 X 10 3/UL (ref 0–0.04)
ABSOLUTE LYMPHOCYTE: 1.41 X 10 3/UL (ref 0.86–4.75)
ABSOLUTE MONOCYTE: 0.52 X 10 3/UL (ref 0.22–1.08)
ALBUMIN SERPL BCP-MCNC: 4.5 G/DL (ref 3.5–5)
ALBUMIN SERPL-MCNC: 4.5 G/DL (ref 3.5–5.2)
ALBUMIN/GLOB SERPL ELPH: 1.4 {RATIO}
ALBUMIN/GLOB SERPL ELPH: 1.4 {RATIO} (ref 1–2.7)
ALP ISOS SERPL LEV INH-CCNC: 99 U/L (ref 35–105)
ALP SERPL-CCNC: 99 U/L (ref 25–125)
ALT (SGPT): 9 U/L (ref 0–33)
ALT SERPL W P-5'-P-CCNC: 9 U/L (ref 7–35)
ANION GAP SERPL CALC-SCNC: 10 MMOL/L (ref 8–17)
ANION GAP SERPL CALC-SCNC: 10 MMOL/L (ref ?–30)
AST SERPL-CCNC: 17 U/L (ref 0–32)
AST SERPL-CCNC: 17 U/L (ref 13–35)
BASOPHILS NFR BLD: 0.3 % (ref 0.2–1.2)
BILIRUB SERPL-MCNC: 0.5 MG/DL (ref 0.1–1.4)
BILIRUBIN, TOTAL: 0.47 MG/DL (ref 0–1.2)
BUN SERPL-MCNC: 10 MG/DL
BUN/CREAT SERPL: 13.4
BUN/CREAT SERPL: 13.4 (ref 6–20)
CALCIUM SERPL-MCNC: 10.3 MG/DL (ref 8.6–10.2)
CALCIUM SERPL-MCNC: 10.3 MG/DL (ref 8.7–10.7)
CARBON DIOXIDE, CO2: 29 MMOL/L (ref 22–29)
CEA: 3.31 NG/ML (ref 0–6.5)
CHLORIDE SERPL-SCNC: 99 MMOL/L (ref 99–108)
CHLORIDE: 99 MMOL/L (ref 98–107)
CO2 SERPL-SCNC: 29 MMOL/L
CREAT SERPL-MCNC: 0.76 MG/DL (ref 0.5–0.9)
CREAT SERPL-MCNC: 0.8 MG/DL (ref 0.5–1.1)
EOSINOPHIL NFR BLD: 2.5 % (ref 0.7–7)
EST. GFR  (NON AFRICAN AMERICAN): 91.3 ML/MIN/1.73 M2
GFR ESTIMATION: 91.34
GLOBULIN: 3.2
GLOBULIN: 3.2 G/DL (ref 1.5–4.5)
GLUCOSE SERPL-MCNC: 169 MG/DL
GLUCOSE: 169 MG/DL (ref 74–106)
HCT VFR BLD AUTO: 42.6 % (ref 37–47)
HGB BLD-MCNC: 14.2 G/DL (ref 12–16)
IMMATURE GRANULOCYTES: 0.7 % (ref 0–0.5)
LYMPHOCYTES NFR BLD: 23.1 % (ref 19.3–53.1)
MCH RBC QN AUTO: 30.1 PG (ref 27–32)
MCHC RBC AUTO-ENTMCNC: 33.3 G/DL (ref 32–36)
MCV RBC AUTO: 90.3 FL (ref 82–100)
MONOCYTES NFR BLD: 8.5 % (ref 4.7–12.5)
NEUTROPHILS # BLD AUTO: 3.96 X 10 3/UL (ref 2.15–7.56)
NEUTROPHILS NFR BLD: 64.9 % (ref 34–71.1)
NUCLEATED RED BLOOD CELLS: 0 /100 WBC (ref 0–0.2)
PLATELET # BLD AUTO: 255 X 10 3/UL (ref 135–400)
POTASSIUM SERPL-SCNC: 5.1 MMOL/L (ref 3.4–5.3)
POTASSIUM: 5.1 MMOL/L (ref 3.5–5.1)
PROT SNV-MCNC: 7.7 G/DL (ref 6.4–8.3)
PROT SNV-MCNC: 7.7 G/L
RBC # BLD AUTO: 4.72 X 10 6/UL (ref 4.2–5.4)
RDW-SD: 42.4 FL (ref 37–54)
SODIUM BLD-SCNC: 138 MMOL/L (ref 137–147)
SODIUM: 138 MMOL/L (ref 136–145)
UREA NITROGEN (BUN): 10.2 MG/DL (ref 6–20)
WBC # BLD: 6.1 X 10 3/UL (ref 4.3–10.8)

## 2022-11-18 ENCOUNTER — TELEPHONE (OUTPATIENT)
Dept: GASTROENTEROLOGY | Facility: CLINIC | Age: 57
End: 2022-11-18
Payer: COMMERCIAL

## 2022-11-18 DIAGNOSIS — C18.9 MALIGNANT NEOPLASM OF COLON, UNSPECIFIED PART OF COLON: Primary | ICD-10-CM

## 2022-11-18 NOTE — TELEPHONE ENCOUNTER
I reviewed the patient's blood and CT results with her directly over the telephone.  She requests referral to Dr. Chris Carter.  Records printed including this note, colonoscopy report, pathology report, blood results, CT results.    Sonia, send referral.  NBP

## 2022-11-28 ENCOUNTER — PATIENT OUTREACH (OUTPATIENT)
Dept: ADMINISTRATIVE | Facility: HOSPITAL | Age: 57
End: 2022-11-28
Payer: COMMERCIAL

## 2022-12-20 ENCOUNTER — TELEPHONE (OUTPATIENT)
Dept: GASTROENTEROLOGY | Facility: CLINIC | Age: 57
End: 2022-12-20
Payer: COMMERCIAL

## 2022-12-22 NOTE — PROGRESS NOTES
Right hemicolectomy pathology report: 1.7 cm, adenocarcinoma, 0/47 LN. pT2 pN0 pG2-3 pR0. Epic chart updated. Has follow up OV with me.  NBP

## 2022-12-29 ENCOUNTER — TELEPHONE (OUTPATIENT)
Dept: FAMILY MEDICINE | Facility: CLINIC | Age: 57
End: 2022-12-29
Payer: COMMERCIAL

## 2023-02-06 ENCOUNTER — PATIENT MESSAGE (OUTPATIENT)
Dept: ADMINISTRATIVE | Facility: HOSPITAL | Age: 58
End: 2023-02-06
Payer: COMMERCIAL

## 2023-02-15 ENCOUNTER — PATIENT OUTREACH (OUTPATIENT)
Dept: ADMINISTRATIVE | Facility: HOSPITAL | Age: 58
End: 2023-02-15
Payer: COMMERCIAL

## 2023-02-15 NOTE — PROGRESS NOTES
A1c Report: Tried calling patient. No answer. Left voicemail. Need to get lab and PCP appt scheduled.

## 2023-03-20 ENCOUNTER — OFFICE VISIT (OUTPATIENT)
Dept: GASTROENTEROLOGY | Facility: CLINIC | Age: 58
End: 2023-03-20
Payer: COMMERCIAL

## 2023-03-20 ENCOUNTER — TELEPHONE (OUTPATIENT)
Dept: GASTROENTEROLOGY | Facility: CLINIC | Age: 58
End: 2023-03-20

## 2023-03-20 VITALS
HEIGHT: 67 IN | SYSTOLIC BLOOD PRESSURE: 144 MMHG | WEIGHT: 243.81 LBS | BODY MASS INDEX: 38.27 KG/M2 | OXYGEN SATURATION: 97 % | HEART RATE: 73 BPM | DIASTOLIC BLOOD PRESSURE: 76 MMHG

## 2023-03-20 DIAGNOSIS — C18.4 MALIGNANT NEOPLASM OF TRANSVERSE COLON: Primary | ICD-10-CM

## 2023-03-20 DIAGNOSIS — Z85.038 ENCOUNTER FOR FOLLOW-UP SURVEILLANCE OF COLON CANCER: ICD-10-CM

## 2023-03-20 DIAGNOSIS — Z08 ENCOUNTER FOR FOLLOW-UP SURVEILLANCE OF COLON CANCER: ICD-10-CM

## 2023-03-20 PROCEDURE — 3078F DIAST BP <80 MM HG: CPT | Mod: CPTII,S$GLB,, | Performed by: INTERNAL MEDICINE

## 2023-03-20 PROCEDURE — 99214 OFFICE O/P EST MOD 30 MIN: CPT | Mod: S$GLB,,, | Performed by: INTERNAL MEDICINE

## 2023-03-20 PROCEDURE — 3077F SYST BP >= 140 MM HG: CPT | Mod: CPTII,S$GLB,, | Performed by: INTERNAL MEDICINE

## 2023-03-20 PROCEDURE — 1159F PR MEDICATION LIST DOCUMENTED IN MEDICAL RECORD: ICD-10-PCS | Mod: CPTII,S$GLB,, | Performed by: INTERNAL MEDICINE

## 2023-03-20 PROCEDURE — 3078F PR MOST RECENT DIASTOLIC BLOOD PRESSURE < 80 MM HG: ICD-10-PCS | Mod: CPTII,S$GLB,, | Performed by: INTERNAL MEDICINE

## 2023-03-20 PROCEDURE — 3077F PR MOST RECENT SYSTOLIC BLOOD PRESSURE >= 140 MM HG: ICD-10-PCS | Mod: CPTII,S$GLB,, | Performed by: INTERNAL MEDICINE

## 2023-03-20 PROCEDURE — 99214 PR OFFICE/OUTPT VISIT, EST, LEVL IV, 30-39 MIN: ICD-10-PCS | Mod: S$GLB,,, | Performed by: INTERNAL MEDICINE

## 2023-03-20 PROCEDURE — 1159F MED LIST DOCD IN RCRD: CPT | Mod: CPTII,S$GLB,, | Performed by: INTERNAL MEDICINE

## 2023-03-20 PROCEDURE — 3008F PR BODY MASS INDEX (BMI) DOCUMENTED: ICD-10-PCS | Mod: CPTII,S$GLB,, | Performed by: INTERNAL MEDICINE

## 2023-03-20 PROCEDURE — 1160F RVW MEDS BY RX/DR IN RCRD: CPT | Mod: CPTII,S$GLB,, | Performed by: INTERNAL MEDICINE

## 2023-03-20 PROCEDURE — 3008F BODY MASS INDEX DOCD: CPT | Mod: CPTII,S$GLB,, | Performed by: INTERNAL MEDICINE

## 2023-03-20 PROCEDURE — 1160F PR REVIEW ALL MEDS BY PRESCRIBER/CLIN PHARMACIST DOCUMENTED: ICD-10-PCS | Mod: CPTII,S$GLB,, | Performed by: INTERNAL MEDICINE

## 2023-03-20 RX ORDER — GLIPIZIDE 10 MG/1
10 TABLET ORAL
COMMUNITY
Start: 2022-11-14 | End: 2023-05-26 | Stop reason: SDUPTHER

## 2023-03-20 RX ORDER — LEVOTHYROXINE SODIUM 25 UG/1
1 TABLET ORAL EVERY MORNING
COMMUNITY
Start: 2022-08-08

## 2023-03-20 RX ORDER — SOD SULF/POT CHLORIDE/MAG SULF 1.479 G
12 TABLET ORAL DAILY
Qty: 24 TABLET | Refills: 0 | Status: SHIPPED | OUTPATIENT
Start: 2023-03-20 | End: 2023-06-29

## 2023-03-20 RX ORDER — DULAGLUTIDE 1.5 MG/.5ML
1.5 INJECTION, SOLUTION SUBCUTANEOUS
COMMUNITY
Start: 2022-11-14 | End: 2023-05-01

## 2023-03-20 RX ORDER — PIOGLITAZONEHYDROCHLORIDE 45 MG/1
1 TABLET ORAL EVERY MORNING
COMMUNITY
Start: 2022-08-08

## 2023-03-20 NOTE — TELEPHONE ENCOUNTER
"[facility]  [address] [place] [address]  Phone: [phone]  Fax: [phone]    History & Physical         Provider: Dr. Jesi Peña    Patient Name: Margaret KWOK (age):1965  57 y.o.           Gender: female   Phone: [phone]     Referring Physician: Kathy Cullen     Vital Signs:   Height - 5' 7"  Weight - 243 lb  BMI -  38.13    Plan: Colonoscopy    Encounter Diagnoses   Name Primary?    Malignant neoplasm of transverse colon Yes    Encounter for follow-up surveillance of colon cancer            History:      Past Medical History:   Diagnosis Date    BMI 37.0-37.9, adult     DM (diabetes mellitus)     TYPE 2    Elevated blood-pressure reading without diagnosis of hypertension     Hypothyroidism     Perimenopausal disorder     Personal history of colon cancer     Personal history of colonic polyps       Past Surgical History:   Procedure Laterality Date     SECTION      x 2    COLONOSCOPY      LAPAROSCOPIC GASTRIC BANDING      RIGHT HEMICOLECTOMY  2022    1.7 cm colon adenocarcinoma, 0/47 LN. pT2 pN0 pG2-3 pR0.      Medication List with Changes/Refills   Current Medications    ATORVASTATIN (LIPITOR) 40 MG TABLET    Take 1 tablet (40 mg total) by mouth once daily.    BLOOD SUGAR DIAGNOSTIC STRP    To check BG one times daily, to use with insurance preferred meter    DULAGLUTIDE (TRULICITY) 1.5 MG/0.5 ML PEN INJECTOR    Inject 1.5 mg into the skin every 7 days.    GABAPENTIN (NEURONTIN) 300 MG CAPSULE    TAKE 1 CAPSULE EVERY EVENING    GLIPIZIDE (GLUCOTROL) 10 MG TABLET    10 mg.    LANCETS MISC    To check BG one times daily, to use with insurance preferred meter    LEVOTHYROXINE (SYNTHROID) 25 MCG TABLET    Take 1 tablet by mouth every morning.    LIDOCAINE (LIDODERM) 5 %    Place 1 patch onto the skin once daily. Remove & Discard patch within 12 hours or as directed by MD "    MELOXICAM (MOBIC) 15 MG TABLET    Take 1 tablet (15 mg total) by mouth once daily.    ONDANSETRON (ZOFRAN-ODT) 4 MG TBDL    Take 1 tablet (4 mg total) by mouth every 8 (eight) hours as needed (Nausea).    PANTOPRAZOLE (PROTONIX) 40 MG TABLET    Take 1 tablet (40 mg total) by mouth once daily.    PIOGLITAZONE (ACTOS) 45 MG TABLET    Take 1 tablet by mouth every morning.    SOD SULF-POT CHLORIDE-MAG SULF (SUTAB) 1.479-0.188- 0.225 GRAM TABLET    Take 12 tablets by mouth once daily. Take according to package instructions with indicated amount of water. No breakfast day before test. May substitute with Suprep, Clenpiq, Plenvu, Moviprep or GoLytely based on Rx plan and patient preference.    TRUEPLUS LANCETS 33 GAUGE MISC    USE 1 LANCET TO CHECK GLUCOSE ONCE DAILY    VENLAFAXINE (EFFEXOR-XR) 75 MG 24 HR CAPSULE    Take 1 capsule (75 mg total) by mouth once daily.      Review of patient's allergies indicates:  No Known Allergies   Family History   Problem Relation Age of Onset    Diabetes Mother     Hypertension Mother     Diabetes Father     Colon cancer Father     Stroke Sister       Social History     Tobacco Use    Smoking status: Former    Smokeless tobacco: Never   Substance Use Topics    Alcohol use: Never    Drug use: Never        Physical Examination:     General Appearance:___________________________  HEENT: _____________________________________  Abdomen:____________________________________  Heart:________________________________________  Lungs:_______________________________________  Extremities:___________________________________  Skin:_________________________________________  Endocrine:____________________________________  Genitourinary:_________________________________  Neurological:__________________________________      Patient has been evaluated immediately prior to sedation and is medically cleared for endoscopy with IVCS as an ASA class: ______      Physician Signature: _________________________        Date: ________  Time: ________

## 2023-03-20 NOTE — PATIENT INSTRUCTIONS
Schedule colonoscopy 11/2023.  Please notify my office if you have not been contacted within two weeks after any procedures, submitting any samples (biopsies, blood, stool, urine, etc.) or after any imaging (X-ray, CT, MRI, etc.).

## 2023-03-20 NOTE — PROGRESS NOTES
Clinic Note    Reason for visit:  The primary encounter diagnosis was Malignant neoplasm of transverse colon. A diagnosis of Encounter for follow-up surveillance of colon cancer was also pertinent to this visit.    PCP: Kathy Cullen       HPI:  This is a 57 y.o. female who had her colon resection of the transverse colon in December 2022.  She had a foot of colon removed and lymph nodes were negative.  She is due for her repeat colonoscopy in November 2023.  We will get that scheduled today.  Her bowel movements have been doing well since surgery.  She has been working.    Right hemicolectomy pathology report: 1.7 cm, adenocarcinoma, 0/47 LN. pT2 pN0 pG2-3 pR0.     present during OV.    Review of Systems   Constitutional:  Negative for chills, diaphoresis, fatigue, fever and unexpected weight change.   HENT:  Negative for mouth sores, nosebleeds, postnasal drip, sore throat, trouble swallowing and voice change.    Eyes:  Negative for pain, discharge and eye dryness.   Respiratory:  Positive for cough. Negative for apnea, choking, chest tightness, shortness of breath and wheezing.    Cardiovascular:  Negative for chest pain, palpitations, leg swelling and claudication.   Gastrointestinal:  Negative for abdominal distention, abdominal pain, anal bleeding, blood in stool, change in bowel habit, constipation, diarrhea, nausea, rectal pain, vomiting, reflux, fecal incontinence and change in bowel habit.   Genitourinary:  Negative for bladder incontinence, difficulty urinating, dysuria, flank pain, frequency and hematuria.   Musculoskeletal:  Positive for back pain. Negative for arthralgias, joint swelling and joint deformity.   Integumentary:  Negative for color change, rash and wound.   Allergic/Immunologic: Negative for environmental allergies and food allergies.   Neurological:  Negative for seizures, facial asymmetry, speech difficulty, weakness, headaches and memory loss.   Hematological:  Negative for  adenopathy. Does not bruise/bleed easily.   Psychiatric/Behavioral:  Negative for agitation, behavioral problems, confusion, hallucinations and sleep disturbance.       Past Medical History:   Diagnosis Date    BMI 37.0-37.9, adult     DM (diabetes mellitus)     TYPE 2    Elevated blood-pressure reading without diagnosis of hypertension     Hypothyroidism     Perimenopausal disorder     Personal history of colon cancer     Personal history of colonic polyps      Past Surgical History:   Procedure Laterality Date     SECTION      x 2    COLONOSCOPY      LAPAROSCOPIC GASTRIC BANDING      RIGHT HEMICOLECTOMY  2022    1.7 cm colon adenocarcinoma, 0/47 LN. pT2 pN0 pG2-3 pR0.     Family History   Problem Relation Age of Onset    Diabetes Mother     Hypertension Mother     Diabetes Father     Colon cancer Father     Stroke Sister      Social History     Tobacco Use    Smoking status: Former    Smokeless tobacco: Never   Substance Use Topics    Alcohol use: Never    Drug use: Never     Review of patient's allergies indicates:  No Known Allergies     Medication List with Changes/Refills   New Medications    SOD SULF-POT CHLORIDE-MAG SULF (SUTAB) 1.479-0.188- 0.225 GRAM TABLET    Take 12 tablets by mouth once daily. Take according to package instructions with indicated amount of water. No breakfast day before test. May substitute with Suprep, Clenpiq, Plenvu, Moviprep or GoLytely based on Rx plan and patient preference.   Current Medications    ATORVASTATIN (LIPITOR) 40 MG TABLET    Take 1 tablet (40 mg total) by mouth once daily.    BLOOD SUGAR DIAGNOSTIC STRP    To check BG one times daily, to use with insurance preferred meter    DULAGLUTIDE (TRULICITY) 1.5 MG/0.5 ML PEN INJECTOR    Inject 1.5 mg into the skin every 7 days.    GABAPENTIN (NEURONTIN) 300 MG CAPSULE    TAKE 1 CAPSULE EVERY EVENING    GLIPIZIDE (GLUCOTROL) 10 MG TABLET    10 mg.    LANCETS MISC    To check BG one times daily, to use with  "insurance preferred meter    LEVOTHYROXINE (SYNTHROID) 25 MCG TABLET    Take 1 tablet by mouth every morning.    LIDOCAINE (LIDODERM) 5 %    Place 1 patch onto the skin once daily. Remove & Discard patch within 12 hours or as directed by MD    MELOXICAM (MOBIC) 15 MG TABLET    Take 1 tablet (15 mg total) by mouth once daily.    ONDANSETRON (ZOFRAN-ODT) 4 MG TBDL    Take 1 tablet (4 mg total) by mouth every 8 (eight) hours as needed (Nausea).    PANTOPRAZOLE (PROTONIX) 40 MG TABLET    Take 1 tablet (40 mg total) by mouth once daily.    PIOGLITAZONE (ACTOS) 45 MG TABLET    Take 1 tablet by mouth every morning.    TRUEPLUS LANCETS 33 GAUGE [facility]    USE 1 LANCET TO CHECK GLUCOSE ONCE DAILY    VENLAFAXINE (EFFEXOR-XR) 75 MG 24 HR CAPSULE    Take 1 capsule (75 mg total) by mouth once daily.   Discontinued Medications    DULAGLUTIDE (TRULICITY) 1.5 MG/0.5 ML PEN INJECTOR    INJECT 1.5 MG UNDER THE SKIN EVERY 7 DAYS    GLIPIZIDE (GLUCOTROL) 10 MG TABLET    TAKE 1 TABLET TWICE A DAY WITH MEALS    LEVOTHYROXINE (SYNTHROID) 25 MCG TABLET    TAKE 1 TABLET BEFORE BREAKFAST    METHOCARBAMOL (ROBAXIN) 750 MG TAB    Take 1 tablet (750 mg total) by mouth 3 (three) times daily as needed (pain).    PIOGLITAZONE (ACTOS) 45 MG TABLET    TAKE 1 TABLET DAILY    SOD SULF-POT CHLORIDE-MAG SULF (SUTAB) 1.479-0.188- 0.225 GRAM TABLET    Take 12 tablets by mouth once daily. Take according to package instructions with indicated amount of water. No breakfast day before test. May substitute with Suprep, Clenpiq, Plenvu, Moviprep or GoLytely based on Rx plan and patient preference.         Vital Signs:  BP (!) 144/76   Pulse 73   Ht 5' 7" (1.702 m)   Wt 110.6 kg (243 lb 12.8 oz)   SpO2 97%   BMI 38.18 kg/m²         Physical Exam  Vitals reviewed.   Constitutional:       General: She is awake. She is not in acute distress.     Appearance: Normal appearance. She is well-developed. She is not ill-appearing, toxic-appearing or diaphoretic.   HENT: "      Head: Normocephalic and atraumatic.      Nose: Nose normal.      Mouth/Throat:      Mouth: Mucous membranes are moist.      Pharynx: Oropharynx is clear. No oropharyngeal exudate or posterior oropharyngeal erythema.   Eyes:      General: Lids are normal. Gaze aligned appropriately. No scleral icterus.        Right eye: No discharge.         Left eye: No discharge.      Extraocular Movements: Extraocular movements intact.      Conjunctiva/sclera: Conjunctivae normal.   Neck:      Trachea: Trachea normal.   Cardiovascular:      Rate and Rhythm: Normal rate and regular rhythm.      Pulses:           Radial pulses are 2+ on the right side and 2+ on the left side.   Pulmonary:      Effort: Pulmonary effort is normal. No respiratory distress.      Breath sounds: Normal breath sounds. No stridor. No wheezing or rhonchi.   Chest:      Chest wall: No tenderness.   Abdominal:      General: Bowel sounds are normal. There is no distension.      Palpations: Abdomen is soft. There is no fluid wave, hepatomegaly or mass.      Tenderness: There is no abdominal tenderness. There is no guarding or rebound.      Comments: Large midline vertical incision above the belly button.  She had some wound opening in the superior and inferior portions that have now completely healed.  No appreciable hernias.   Musculoskeletal:         General: No tenderness or deformity.      Cervical back: Full passive range of motion without pain and neck supple. No tenderness.      Right lower leg: No edema.      Left lower leg: No edema.   Lymphadenopathy:      Cervical: No cervical adenopathy.   Skin:     General: Skin is warm and dry.      Capillary Refill: Capillary refill takes less than 2 seconds.      Coloration: Skin is not cyanotic, jaundiced or pale.      Findings: No rash.   Neurological:      General: No focal deficit present.      Mental Status: She is alert and oriented to person, place, and time.      Cranial Nerves: No facial asymmetry.       Motor: No tremor.   Psychiatric:         Attention and Perception: Attention normal.         Mood and Affect: Mood and affect normal.         Speech: Speech normal.         Behavior: Behavior normal. Behavior is cooperative.          All of the data above and below has been reviewed by myself and any further interpretations will be reflected in the assessment and plan.   The data includes review of external notes, and independent interpretation of lab results, procedures, x-rays, and imaging reports.      Assessment:  Malignant neoplasm of transverse colon  Comments:  s/p resection 12/2022  Orders:  -     Ambulatory Referral to External Surgery    Encounter for follow-up surveillance of colon cancer  -     Ambulatory Referral to External Surgery    Other orders  -     sod sulf-pot chloride-mag sulf (SUTAB) 1.479-0.188- 0.225 gram tablet; Take 12 tablets by mouth once daily. Take according to package instructions with indicated amount of water. No breakfast day before test. May substitute with Suprep, Clenpiq, Plenvu, Moviprep or GoLytely based on Rx plan and patient preference.  Dispense: 24 tablet; Refill: 0    Repeat colonoscopy November 2023.  We will order it now so she can schedule it for that time.  She will notify me sooner if any issues.     Recommendations:  Schedule colonoscopy 11/2023.    Please notify my office if you have not been contacted within two weeks after any procedures, submitting any samples (biopsies, blood, stool, urine, etc.) or after any imaging (X-ray, CT, MRI, etc.).     Risks, benefits, and alternatives of medical management, any associated procedures, and/or treatment discussed with the patient. Patient given opportunity to ask questions and voices understanding. Patient has elected to proceed with the recommended care modalities as discussed.    Follow up in about 18 months (around 9/20/2024).    Order summary:  Orders Placed This Encounter   Procedures    Ambulatory Referral to  External Surgery        Instructed patient to notify my office if they have not been contacted within two weeks after any procedures, submitting any samples (biopsies, blood, stool, urine, etc.) or after any imaging (X-ray, CT, MRI, etc.).     Jesi Peña MD    This document may have been created using a voice recognition transcribing system. Incorrect words or phrases may have been missed during proofreading. Please interpret accordingly or contact me for clarification.

## 2023-03-20 NOTE — LETTER
March 20, 2023        Kathy Cullen, NP  [address]  [address]  [place] [address]             [facility]  [address] [place] [address]  Phone: [phone]  Fax: [phone]   Patient: Margaret Pate   MR Number: [id number]   YOB: 1965   Date of Visit: 3/20/2023       Dear Dr. Cullen:    Thank you for referring Margaret Pate to me for evaluation. Attached you will find relevant portions of my assessment and plan of care.    If you have questions, please do not hesitate to call me. I look forward to following Margaret Pate along with you.    Sincerely,      MD SUSANNA Johnson MD En[facility]

## 2023-04-27 ENCOUNTER — PATIENT OUTREACH (OUTPATIENT)
Dept: ADMINISTRATIVE | Facility: HOSPITAL | Age: 58
End: 2023-04-27
Payer: COMMERCIAL

## 2023-04-27 NOTE — PROGRESS NOTES
Working Mammogram Report:     Pt overdue for mammogram. Called to offer scheduling.  No answer, left message.  M

## 2023-04-28 ENCOUNTER — PATIENT OUTREACH (OUTPATIENT)
Dept: ADMINISTRATIVE | Facility: HOSPITAL | Age: 58
End: 2023-04-28
Payer: COMMERCIAL

## 2023-04-28 NOTE — PROGRESS NOTES
Working [facility] -A1C Report.    Lab [facility]-no results found.  [facility]-no results found.  [facility]-no recent results found.  [facility]-no results found.  [facility]-no recent results found.    Assisted to schedule annual exam/follow up diabetes, 6/20/23.

## 2023-04-28 NOTE — PROGRESS NOTES
Working Cervical Cancer Screening Report:      Pt is overdue for Pap Smear. Called top discuss scheduling.  Declined at this time, she will call when she is ready to schedule.

## 2023-04-28 NOTE — Clinical Note
Pt is scheduled for annual 6/20- if would like her to get her labs done prior to appt, please place orders and have someone notify pt

## 2023-05-01 DIAGNOSIS — E11.42 DIABETIC POLYNEUROPATHY ASSOCIATED WITH TYPE 2 DIABETES MELLITUS: Primary | ICD-10-CM

## 2023-05-01 DIAGNOSIS — E78.5 HYPERLIPIDEMIA, UNSPECIFIED HYPERLIPIDEMIA TYPE: ICD-10-CM

## 2023-05-01 RX ORDER — VENLAFAXINE HYDROCHLORIDE 75 MG/1
CAPSULE, EXTENDED RELEASE ORAL
Qty: 90 CAPSULE | Refills: 3 | Status: SHIPPED | OUTPATIENT
Start: 2023-05-01

## 2023-05-01 RX ORDER — DULAGLUTIDE 1.5 MG/.5ML
1.5 INJECTION, SOLUTION SUBCUTANEOUS
Qty: 12 PEN | Refills: 3 | Status: SHIPPED | OUTPATIENT
Start: 2023-05-01 | End: 2023-06-29

## 2023-05-10 ENCOUNTER — PATIENT OUTREACH (OUTPATIENT)
Dept: ADMINISTRATIVE | Facility: HOSPITAL | Age: 58
End: 2023-05-10
Payer: COMMERCIAL

## 2023-05-10 DIAGNOSIS — Z12.31 ENCOUNTER FOR SCREENING MAMMOGRAM FOR BREAST CANCER: Primary | ICD-10-CM

## 2023-05-10 NOTE — PROGRESS NOTES
Msg sent to zohreh with central scheduling. She will contact pt to get MMG scheduled.     Zohreh able to reach pt and MMG scheduled for 5/30. Remind me created.

## 2023-05-10 NOTE — PROGRESS NOTES
Working Overdue Mammo Report.    Discussed mammo. Said she had scheduled it but had to cancel. Would like to get it scheduled in June, when school is out. Advised her I would have someone reach out to help her get appt.

## 2023-05-16 ENCOUNTER — PATIENT OUTREACH (OUTPATIENT)
Dept: ADMINISTRATIVE | Facility: HOSPITAL | Age: 58
End: 2023-05-16
Payer: COMMERCIAL

## 2023-05-16 NOTE — PROGRESS NOTES
Working Lab Report: Pt due for annual labs. She has an appt with PCP in June 2022 with labs. Also uploaded 11.17.2022 external Lab

## 2023-05-17 ENCOUNTER — PATIENT OUTREACH (OUTPATIENT)
Dept: ADMINISTRATIVE | Facility: HOSPITAL | Age: 58
End: 2023-05-17
Payer: COMMERCIAL

## 2023-05-26 RX ORDER — GLIPIZIDE 10 MG/1
10 TABLET ORAL 2 TIMES DAILY WITH MEALS
Qty: 180 TABLET | Refills: 1 | Status: SHIPPED | OUTPATIENT
Start: 2023-05-26 | End: 2023-06-01

## 2023-05-26 NOTE — TELEPHONE ENCOUNTER
----- Message from Arlet Monsalve sent at 5/26/2023  9:27 AM CDT -----  Regarding: med refill  Contact: pt  Type:  RX Refill Request    Who Called:  chasity Pate   Refill or New Rx: refill   RX Name and Strength: glipiZIDE (GLUCOTROL) 10 MG tablet  How is the patient currently taking it? (ex. 1XDay): 2x day  Is this a 30 day or 90 day RX:30 day  Preferred Pharmacy with phone number:     [facility] [address] [place], [place] - [address] [address]  Phone: [phone] Fax: [phone]    Local or Mail Order:local   Ordering Provider: Alyse  Would the patient rather a call back or a response via [facility]?  Call back   Best Call Back Number: [phone]  Additional Information:

## 2023-05-30 DIAGNOSIS — E78.5 HYPERLIPIDEMIA, UNSPECIFIED HYPERLIPIDEMIA TYPE: ICD-10-CM

## 2023-05-31 RX ORDER — ATORVASTATIN CALCIUM 40 MG/1
TABLET, FILM COATED ORAL
Qty: 90 TABLET | Refills: 3 | Status: SHIPPED | OUTPATIENT
Start: 2023-05-31

## 2023-06-01 RX ORDER — GLIPIZIDE 10 MG/1
10 TABLET ORAL 2 TIMES DAILY WITH MEALS
Qty: 180 TABLET | Refills: 1 | Status: SHIPPED | OUTPATIENT
Start: 2023-06-01

## 2023-06-02 LAB — BCS RECOMMENDATION EXT: NORMAL

## 2023-06-06 ENCOUNTER — PATIENT MESSAGE (OUTPATIENT)
Dept: FAMILY MEDICINE | Facility: CLINIC | Age: 58
End: 2023-06-06
Payer: COMMERCIAL

## 2023-06-06 ENCOUNTER — PATIENT OUTREACH (OUTPATIENT)
Dept: ADMINISTRATIVE | Facility: HOSPITAL | Age: 58
End: 2023-06-06
Payer: COMMERCIAL

## 2023-06-09 NOTE — TELEPHONE ENCOUNTER
----- Message from Maureen Griffith sent at 6/9/2023  2:43 PM CDT -----  Patient is requesting a call back in regards to glucose monitor..please call her back at [phone]

## 2023-06-10 ENCOUNTER — PATIENT MESSAGE (OUTPATIENT)
Dept: FAMILY MEDICINE | Facility: CLINIC | Age: 58
End: 2023-06-10
Payer: COMMERCIAL

## 2023-06-12 ENCOUNTER — PATIENT MESSAGE (OUTPATIENT)
Dept: FAMILY MEDICINE | Facility: CLINIC | Age: 58
End: 2023-06-12
Payer: COMMERCIAL

## 2023-06-12 RX ORDER — BLOOD-GLUCOSE SENSOR
1 EACH MISCELLANEOUS 3 TIMES DAILY
Qty: 1 EACH | Refills: 0 | Status: SHIPPED | OUTPATIENT
Start: 2023-06-12 | End: 2023-07-26

## 2023-06-15 ENCOUNTER — PATIENT MESSAGE (OUTPATIENT)
Dept: FAMILY MEDICINE | Facility: CLINIC | Age: 58
End: 2023-06-15
Payer: COMMERCIAL

## 2023-06-16 ENCOUNTER — PATIENT OUTREACH (OUTPATIENT)
Dept: ADMINISTRATIVE | Facility: HOSPITAL | Age: 58
End: 2023-06-16
Payer: COMMERCIAL

## 2023-06-16 ENCOUNTER — PATIENT MESSAGE (OUTPATIENT)
Dept: ADMINISTRATIVE | Facility: HOSPITAL | Age: 58
End: 2023-06-16
Payer: COMMERCIAL

## 2023-06-16 NOTE — PROGRESS NOTES
Working A1C gap report. Portal msg sent reminding pt to complete her labs prior to her appt with PCP on 6/29/23.

## 2023-06-29 ENCOUNTER — OFFICE VISIT (OUTPATIENT)
Dept: FAMILY MEDICINE | Facility: CLINIC | Age: 58
End: 2023-06-29
Payer: COMMERCIAL

## 2023-06-29 VITALS
HEART RATE: 88 BPM | OXYGEN SATURATION: 95 % | RESPIRATION RATE: 18 BRPM | TEMPERATURE: 98 F | SYSTOLIC BLOOD PRESSURE: 132 MMHG | DIASTOLIC BLOOD PRESSURE: 84 MMHG | BODY MASS INDEX: 38.14 KG/M2 | HEIGHT: 67 IN | WEIGHT: 243 LBS

## 2023-06-29 DIAGNOSIS — F40.10 SOCIAL ANXIETY DISORDER: Chronic | ICD-10-CM

## 2023-06-29 DIAGNOSIS — E11.42 DIABETIC POLYNEUROPATHY ASSOCIATED WITH TYPE 2 DIABETES MELLITUS: Primary | Chronic | ICD-10-CM

## 2023-06-29 DIAGNOSIS — E66.01 SEVERE OBESITY (BMI 35.0-39.9) WITH COMORBIDITY: Chronic | ICD-10-CM

## 2023-06-29 DIAGNOSIS — Z23 NEED FOR PROPHYLACTIC VACCINATION WITH STREPTOCOCCUS PNEUMONIAE (PNEUMOCOCCUS) AND INFLUENZA VACCINES: ICD-10-CM

## 2023-06-29 PROCEDURE — 1160F RVW MEDS BY RX/DR IN RCRD: CPT | Mod: CPTII,S$GLB,, | Performed by: NURSE PRACTITIONER

## 2023-06-29 PROCEDURE — 3075F SYST BP GE 130 - 139MM HG: CPT | Mod: CPTII,S$GLB,, | Performed by: NURSE PRACTITIONER

## 2023-06-29 PROCEDURE — 3066F NEPHROPATHY DOC TX: CPT | Mod: CPTII,S$GLB,, | Performed by: NURSE PRACTITIONER

## 2023-06-29 PROCEDURE — 3008F BODY MASS INDEX DOCD: CPT | Mod: CPTII,S$GLB,, | Performed by: NURSE PRACTITIONER

## 2023-06-29 PROCEDURE — 90471 PNEUMOCOCCAL POLYSACCHARIDE VACCINE 23-VALENT =>2YO SQ IM: ICD-10-PCS | Mod: S$GLB,,, | Performed by: NURSE PRACTITIONER

## 2023-06-29 PROCEDURE — 1159F PR MEDICATION LIST DOCUMENTED IN MEDICAL RECORD: ICD-10-PCS | Mod: CPTII,S$GLB,, | Performed by: NURSE PRACTITIONER

## 2023-06-29 PROCEDURE — 3061F NEG MICROALBUMINURIA REV: CPT | Mod: CPTII,S$GLB,, | Performed by: NURSE PRACTITIONER

## 2023-06-29 PROCEDURE — 3079F DIAST BP 80-89 MM HG: CPT | Mod: CPTII,S$GLB,, | Performed by: NURSE PRACTITIONER

## 2023-06-29 PROCEDURE — 1159F MED LIST DOCD IN RCRD: CPT | Mod: CPTII,S$GLB,, | Performed by: NURSE PRACTITIONER

## 2023-06-29 PROCEDURE — 3061F PR NEG MICROALBUMINURIA RESULT DOCUMENTED/REVIEW: ICD-10-PCS | Mod: CPTII,S$GLB,, | Performed by: NURSE PRACTITIONER

## 2023-06-29 PROCEDURE — 3052F HG A1C>EQUAL 8.0%<EQUAL 9.0%: CPT | Mod: CPTII,S$GLB,, | Performed by: NURSE PRACTITIONER

## 2023-06-29 PROCEDURE — 1160F PR REVIEW ALL MEDS BY PRESCRIBER/CLIN PHARMACIST DOCUMENTED: ICD-10-PCS | Mod: CPTII,S$GLB,, | Performed by: NURSE PRACTITIONER

## 2023-06-29 PROCEDURE — 90732 PPSV23 VACC 2 YRS+ SUBQ/IM: CPT | Mod: S$GLB,,, | Performed by: NURSE PRACTITIONER

## 2023-06-29 PROCEDURE — 3079F PR MOST RECENT DIASTOLIC BLOOD PRESSURE 80-89 MM HG: ICD-10-PCS | Mod: CPTII,S$GLB,, | Performed by: NURSE PRACTITIONER

## 2023-06-29 PROCEDURE — 3052F PR MOST RECENT HEMOGLOBIN A1C LEVEL 8.0 - < 9.0%: ICD-10-PCS | Mod: CPTII,S$GLB,, | Performed by: NURSE PRACTITIONER

## 2023-06-29 PROCEDURE — 3075F PR MOST RECENT SYSTOLIC BLOOD PRESS GE 130-139MM HG: ICD-10-PCS | Mod: CPTII,S$GLB,, | Performed by: NURSE PRACTITIONER

## 2023-06-29 PROCEDURE — 90471 IMMUNIZATION ADMIN: CPT | Mod: S$GLB,,, | Performed by: NURSE PRACTITIONER

## 2023-06-29 PROCEDURE — 99214 PR OFFICE/OUTPT VISIT, EST, LEVL IV, 30-39 MIN: ICD-10-PCS | Mod: 25,S$GLB,, | Performed by: NURSE PRACTITIONER

## 2023-06-29 PROCEDURE — 90732 PNEUMOCOCCAL POLYSACCHARIDE VACCINE 23-VALENT =>2YO SQ IM: ICD-10-PCS | Mod: S$GLB,,, | Performed by: NURSE PRACTITIONER

## 2023-06-29 PROCEDURE — 99214 OFFICE O/P EST MOD 30 MIN: CPT | Mod: 25,S$GLB,, | Performed by: NURSE PRACTITIONER

## 2023-06-29 PROCEDURE — 3066F PR DOCUMENTATION OF TREATMENT FOR NEPHROPATHY: ICD-10-PCS | Mod: CPTII,S$GLB,, | Performed by: NURSE PRACTITIONER

## 2023-06-29 PROCEDURE — 3008F PR BODY MASS INDEX (BMI) DOCUMENTED: ICD-10-PCS | Mod: CPTII,S$GLB,, | Performed by: NURSE PRACTITIONER

## 2023-06-29 RX ORDER — TIRZEPATIDE 5 MG/.5ML
5 INJECTION, SOLUTION SUBCUTANEOUS
Qty: 4 PEN | Refills: 11 | Status: SHIPPED | OUTPATIENT
Start: 2023-06-29 | End: 2023-07-25

## 2023-06-29 RX ORDER — BUSPIRONE HYDROCHLORIDE 7.5 MG/1
7.5 TABLET ORAL 3 TIMES DAILY PRN
Qty: 90 TABLET | Refills: 5 | Status: SHIPPED | OUTPATIENT
Start: 2023-06-29 | End: 2024-06-28

## 2023-06-29 NOTE — PROGRESS NOTES
Subjective:       Patient ID: Margaret Pate is a 58 y.o. female.    Chief Complaint: Annual Exam (Pt is here for an annual check up and lab review. Pt states she feels her diabetic medications aren't working.)    F/u DM2. Her last A1c was 7.8%, today her A1c is 8.5%. She admits that she has been laxed on her diabetic diet. She is due for her diabetic eye exams.       Reports worsening social anxiety despite compliancy with venlafaxine      Hyperlipidemia    Type of hyperlipidemia: combined  Duration: chronic  Control: usually well controlled; at goal  Compliance: compliant; compliant with diet; exercises  Complications: no coronary artery disease; no cerebral vascular disease, no peripheral artery disease  ASCVD:The 10-year ASCVD risk score (Yvon LIVE, et al., 2019) is: 4.8%    Values used to calculate the score:      Age: 58 years      Sex: Female      Is Non- : No      Diabetic: Yes      Tobacco smoker: No      Systolic Blood Pressure: 132 mmHg      Is BP treated: No      HDL Cholesterol: 53 mg/dL      Total Cholesterol: 166 mg/dL        Review of Systems   Constitutional:  Positive for fatigue. Negative for fever.   Respiratory:  Negative for chest tightness and shortness of breath.    Cardiovascular:  Negative for chest pain and palpitations.   Gastrointestinal:  Negative for abdominal pain, nausea and vomiting.   Skin:  Negative for rash.   Neurological:  Negative for dizziness, light-headedness and headaches.   Psychiatric/Behavioral:  Negative for dysphoric mood. The patient is nervous/anxious.          Past Medical History:  Past Medical History:   Diagnosis Date    BMI 37.0-37.9, adult     DM (diabetes mellitus)     TYPE 2    Elevated blood-pressure reading without diagnosis of hypertension     Hypothyroidism     Perimenopausal disorder     Personal history of colon cancer     Personal history of colonic polyps       Past Surgical History:   Procedure Laterality Date     SECTION       x 2    COLONOSCOPY      LAPAROSCOPIC GASTRIC BANDING      RIGHT HEMICOLECTOMY  12/12/2022    1.7 cm colon adenocarcinoma, 0/47 LN. pT2 pN0 pG2-3 pR0.      Review of patient's allergies indicates:  No Known Allergies   Current Outpatient Medications   Medication Sig Dispense Refill    atorvastatin (LIPITOR) 40 MG tablet TAKE 1 TABLET DAILY 90 tablet 3    blood-glucose sensor (FREESTYLE MARIBEL 3 SENSOR) Leticia 1 Device by Misc.(Non-Drug; Combo Route) route 3 (three) times daily. 1 each 0    glipiZIDE (GLUCOTROL) 10 MG tablet Take 1 tablet (10 mg total) by mouth 2 (two) times daily with meals. 180 tablet 1    levothyroxine (SYNTHROID) 25 MCG tablet Take 1 tablet by mouth every morning.      LIDOcaine (LIDODERM) 5 % Place 1 patch onto the skin once daily. Remove & Discard patch within 12 hours or as directed by MD 30 patch 2    meloxicam (MOBIC) 15 MG tablet Take 1 tablet (15 mg total) by mouth once daily. 90 tablet 0    pantoprazole (PROTONIX) 40 MG tablet Take 1 tablet (40 mg total) by mouth once daily. 90 tablet 3    pioglitazone (ACTOS) 45 MG tablet Take 1 tablet by mouth every morning.      venlafaxine (EFFEXOR-XR) 75 MG 24 hr capsule TAKE 1 CAPSULE DAILY 90 capsule 3    busPIRone (BUSPAR) 7.5 MG tablet Take 1 tablet (7.5 mg total) by mouth 3 (three) times daily as needed (anxiety). 90 tablet 5    tirzepatide (MOUNJARO) 5 mg/0.5 mL PnIj Inject 5 mg into the skin every 7 days. 4 pen 11     No current facility-administered medications for this visit.     Social History     Socioeconomic History    Marital status:    Tobacco Use    Smoking status: Former    Smokeless tobacco: Never   Substance and Sexual Activity    Alcohol use: Never    Drug use: Never      Family History   Problem Relation Age of Onset    Diabetes Mother     Hypertension Mother     Diabetes Father     Colon cancer Father     Stroke Sister         Objective:      Physical Exam  Constitutional:       Appearance: She is well-developed. She is  obese.   HENT:      Head: Normocephalic and atraumatic.   Eyes:      General: No scleral icterus.     Conjunctiva/sclera: Conjunctivae normal.      Pupils: Pupils are equal, round, and reactive to light.   Neck:      Thyroid: No thyroid mass.      Vascular: No carotid bruit.      Trachea: Trachea normal.   Cardiovascular:      Rate and Rhythm: Normal rate and regular rhythm.   Pulmonary:      Effort: Pulmonary effort is normal.      Breath sounds: Normal breath sounds.   Musculoskeletal:      Cervical back: Normal range of motion and neck supple.   Neurological:      Mental Status: She is alert and oriented to person, place, and time.   Psychiatric:         Mood and Affect: Mood normal.         Behavior: Behavior normal.       Assessment:     1. Diabetic polyneuropathy associated with type 2 diabetes mellitus Sub-optimally controlled   2. Social anxiety disorder Active   3. Severe obesity (BMI 35.0-39.9) with comorbidity Active   4. Need for prophylactic vaccination with Streptococcus pneumoniae (Pneumococcus) and Influenza vaccines      Plan:       PROBLEM LIST     Diabetic polyneuropathy associated with type 2 diabetes mellitus  Comments:  A1c up to 8.6%; Change Trulicity to Mounjaro. RTC in 12 weeks for A1c check. Can make dose adjustment before next appt if she tolerates Mounjaro.   Orders:  -     tirzepatide (MOUNJARO) 5 mg/0.5 mL PnIj; Inject 5 mg into the skin every 7 days.  Dispense: 4 pen; Refill: 11    Social anxiety disorder  Comments:  start Buspar as directed  Orders:  -     busPIRone (BUSPAR) 7.5 MG tablet; Take 1 tablet (7.5 mg total) by mouth 3 (three) times daily as needed (anxiety).  Dispense: 90 tablet; Refill: 5    Severe obesity (BMI 35.0-39.9) with comorbidity  Comments:  reminded to adhere to ADA diet and recommend at least 150 min exercise weekly    Need for prophylactic vaccination with Streptococcus pneumoniae (Pneumococcus) and Influenza vaccines  -     (In Office Administered)  Pneumococcal Polysaccharide Vaccine (23 Valent) (SQ/IM)

## 2023-07-05 ENCOUNTER — PATIENT MESSAGE (OUTPATIENT)
Dept: FAMILY MEDICINE | Facility: CLINIC | Age: 58
End: 2023-07-05
Payer: COMMERCIAL

## 2023-07-25 DIAGNOSIS — E11.42 DIABETIC POLYNEUROPATHY ASSOCIATED WITH TYPE 2 DIABETES MELLITUS: Primary | ICD-10-CM

## 2023-07-26 RX ORDER — BLOOD-GLUCOSE SENSOR
EACH MISCELLANEOUS
Qty: 1 EACH | Refills: 0 | Status: SHIPPED | OUTPATIENT
Start: 2023-07-26 | End: 2024-02-12

## 2023-08-16 DIAGNOSIS — E11.42 DIABETIC POLYNEUROPATHY ASSOCIATED WITH TYPE 2 DIABETES MELLITUS: ICD-10-CM

## 2023-08-21 ENCOUNTER — PATIENT MESSAGE (OUTPATIENT)
Dept: FAMILY MEDICINE | Facility: CLINIC | Age: 58
End: 2023-08-21
Payer: COMMERCIAL

## 2023-09-11 ENCOUNTER — PATIENT MESSAGE (OUTPATIENT)
Dept: FAMILY MEDICINE | Facility: CLINIC | Age: 58
End: 2023-09-11
Payer: COMMERCIAL

## 2023-09-11 DIAGNOSIS — E11.42 DIABETIC POLYNEUROPATHY ASSOCIATED WITH TYPE 2 DIABETES MELLITUS: Primary | ICD-10-CM

## 2023-10-12 ENCOUNTER — TELEPHONE (OUTPATIENT)
Dept: GASTROENTEROLOGY | Facility: CLINIC | Age: 58
End: 2023-10-12
Payer: COMMERCIAL

## 2023-10-12 ENCOUNTER — PATIENT MESSAGE (OUTPATIENT)
Dept: GASTROENTEROLOGY | Facility: CLINIC | Age: 58
End: 2023-10-12
Payer: COMMERCIAL

## 2023-10-12 NOTE — TELEPHONE ENCOUNTER
----- Message from Roxy Luong sent at 10/12/2023  9:33 AM CDT -----  Contact: pt  Wanting to know when the prep for her procedure will be called in to her pharm and can be reached through the portal //thanks/dbw

## 2023-10-12 NOTE — TELEPHONE ENCOUNTER
Message sent to pt through portal. Asking her to confirm her pharmacy and if she has coupon for Sutab.

## 2023-10-31 NOTE — TELEPHONE ENCOUNTER
----- Message from Bam Quiros sent at 1/12/2022  1:31 PM CST -----  Contact: self  Patient called in and stated that she tested positive for Covid. Thanks     (1) Other Diagnosis

## 2023-11-01 ENCOUNTER — PATIENT MESSAGE (OUTPATIENT)
Dept: FAMILY MEDICINE | Facility: CLINIC | Age: 58
End: 2023-11-01
Payer: COMMERCIAL

## 2023-11-03 DIAGNOSIS — Z85.038 ENCOUNTER FOR FOLLOW-UP SURVEILLANCE OF COLON CANCER: ICD-10-CM

## 2023-11-03 DIAGNOSIS — C18.4 MALIGNANT NEOPLASM OF TRANSVERSE COLON: Primary | ICD-10-CM

## 2023-11-03 DIAGNOSIS — Z08 ENCOUNTER FOR FOLLOW-UP SURVEILLANCE OF COLON CANCER: ICD-10-CM

## 2023-11-03 NOTE — PROGRESS NOTES
[facility]  [address] [place] [address]  Phone: [phone]  Fax: [phone]    History & Physical         Provider: Dr. Jesi Peña    Patient Name: Margaret KWOK (age):1965  58 y.o.           Gender: female   Phone: [phone]     Referring Physician: Kathy Cullen     Vital Signs:   Height - 5'7  Weight - 243 lb  BMI -  38.18    Plan: Colonoscopy    Encounter Diagnoses   Name Primary?    Malignant neoplasm of transverse colon Yes    Encounter for follow-up surveillance of colon cancer            History:      Past Medical History:   Diagnosis Date    BMI 37.0-37.9, adult     DM (diabetes mellitus)     TYPE 2    Elevated blood-pressure reading without diagnosis of hypertension     Hypothyroidism     Perimenopausal disorder     Personal history of colon cancer     Personal history of colonic polyps       Past Surgical History:   Procedure Laterality Date     SECTION      x 2    COLONOSCOPY      LAPAROSCOPIC GASTRIC BANDING      RIGHT HEMICOLECTOMY  2022    1.7 cm colon adenocarcinoma, 0/47 LN. pT2 pN0 pG2-3 pR0.      Medication List with Changes/Refills   Current Medications    ATORVASTATIN (LIPITOR) 40 MG TABLET    TAKE 1 TABLET DAILY    BUSPIRONE (BUSPAR) 7.5 MG TABLET    Take 1 tablet (7.5 mg total) by mouth 3 (three) times daily as needed (anxiety).    FREESTYLE MARIBEL 3 SENSOR TODD    CHECK BLOOD GLUCOSE THREE TIMES DAILY. CHANGE SENSOR EVERY 14 DAYS    GLIPIZIDE (GLUCOTROL) 10 MG TABLET    Take 1 tablet (10 mg total) by mouth 2 (two) times daily with meals.    LEVOTHYROXINE (SYNTHROID) 25 MCG TABLET    Take 1 tablet by mouth every morning.    LIDOCAINE (LIDODERM) 5 %    Place 1 patch onto the skin once daily. Remove & Discard patch within 12 hours or as directed by MD    MELOXICAM (MOBIC) 15 MG TABLET    Take 1 tablet (15 mg total) by mouth once daily.    ONDANSETRON  (ZOFRAN-ODT) 4 MG TBDL    Take 2 tablets (8 mg total) by mouth every 8 (eight) hours as needed (nausea).    PANTOPRAZOLE (PROTONIX) 40 MG TABLET    Take 1 tablet (40 mg total) by mouth once daily.    PIOGLITAZONE (ACTOS) 45 MG TABLET    Take 1 tablet by mouth every morning.    SOD SULF-POT CHLORIDE-MAG SULF (SUTAB) 1.479-0.188- 0.225 GRAM TABLET    Take 12 tablets by mouth once daily. Take according to package instructions with indicated amount of water. No breakfast day before test. May substitute with Suprep, Clenpiq, Plenvu, Moviprep or GoLytely based on Rx plan and patient preference.    TIRZEPATIDE 12.5 MG/0.5 ML PNIJ    Inject 12.5 mg into the skin every 7 days.    VENLAFAXINE (EFFEXOR-XR) 75 MG 24 HR CAPSULE    TAKE 1 CAPSULE DAILY      Review of patient's allergies indicates:  No Known Allergies   Family History   Problem Relation Age of Onset    Diabetes Mother     Hypertension Mother     Diabetes Father     Colon cancer Father     Stroke Sister       Social History     Tobacco Use    Smoking status: Former    Smokeless tobacco: Never   Substance Use Topics    Alcohol use: Never    Drug use: Never        Physical Examination:     General Appearance:___________________________  HEENT: _____________________________________  Abdomen:____________________________________  Heart:________________________________________  Lungs:_______________________________________  Extremities:___________________________________  Skin:_________________________________________  Endocrine:____________________________________  Genitourinary:_________________________________  Neurological:__________________________________      Patient has been evaluated immediately prior to sedation and is medically cleared for endoscopy with IVCS as an ASA class: ______      Physician Signature: _________________________       Date: ________  Time: ________

## 2023-11-06 ENCOUNTER — OUTSIDE PLACE OF SERVICE (OUTPATIENT)
Dept: GASTROENTEROLOGY | Facility: CLINIC | Age: 58
End: 2023-11-06

## 2023-11-06 LAB — CRC RECOMMENDATION EXT: NORMAL

## 2023-11-06 PROCEDURE — 45385 COLONOSCOPY W/LESION REMOVAL: CPT | Mod: 33,,, | Performed by: INTERNAL MEDICINE

## 2023-11-06 PROCEDURE — 45385 PR COLONOSCOPY,REMV LESN,SNARE: ICD-10-PCS | Mod: 33,,, | Performed by: INTERNAL MEDICINE

## 2023-11-07 LAB — SPECIMEN TO PATHOLOGY: NORMAL

## 2023-11-12 ENCOUNTER — TELEPHONE (OUTPATIENT)
Dept: GASTROENTEROLOGY | Facility: CLINIC | Age: 58
End: 2023-11-12
Payer: COMMERCIAL

## 2023-11-12 NOTE — TELEPHONE ENCOUNTER
1 TA, repeat colonoscopy in one year.     Notify patient. Confirm recall tab in appointment desk is up-to-date (update if needed). Keep follow up OV with me and notify me sooner if any issues.  NBP

## 2023-12-11 ENCOUNTER — DOCUMENTATION ONLY (OUTPATIENT)
Dept: GASTROENTEROLOGY | Facility: CLINIC | Age: 58
End: 2023-12-11
Payer: COMMERCIAL

## 2023-12-15 ENCOUNTER — PATIENT OUTREACH (OUTPATIENT)
Dept: ADMINISTRATIVE | Facility: HOSPITAL | Age: 58
End: 2023-12-15
Payer: COMMERCIAL

## 2023-12-15 NOTE — PROGRESS NOTES
OHN A1c report: Attempted to contact the patient to schedule follow up with PCP and recheck of Hemoglobin A1c, no answer, left voicemail.

## 2023-12-26 ENCOUNTER — PATIENT MESSAGE (OUTPATIENT)
Dept: ADMINISTRATIVE | Facility: HOSPITAL | Age: 58
End: 2023-12-26
Payer: COMMERCIAL

## 2024-01-05 ENCOUNTER — TELEPHONE (OUTPATIENT)
Dept: FAMILY MEDICINE | Facility: CLINIC | Age: 59
End: 2024-01-05
Payer: COMMERCIAL

## 2024-01-05 ENCOUNTER — PATIENT MESSAGE (OUTPATIENT)
Dept: FAMILY MEDICINE | Facility: CLINIC | Age: 59
End: 2024-01-05
Payer: COMMERCIAL

## 2024-01-05 NOTE — TELEPHONE ENCOUNTER
----- Message from Adore Beverly sent at 1/5/2024  8:39 AM CST -----  Type:  Needs Medical Advice    Who Called: Margaret Pate    Symptoms (please be specific): -   How long has patient had these symptoms:  -  Pharmacy name and phone #:  -  Would the patient rather a call back or a response via [facility]?    Best Call Back Number: [phone]    Additional Information: Pt needs to rs nursu visit to have A1C checked please call

## 2024-01-05 NOTE — TELEPHONE ENCOUNTER
Internal Medicine Attending Addendum:  I saw and evaluated the patient. I discussed the patient's case with resident Fredi Hopkins MD . I agree with resident's findings and plan as documented in today's note  Sravani Fairchild MD FACP       Sent pt msg via pt portal asking when will she like to come in for nurse visit to check A1C next week.

## 2024-01-08 ENCOUNTER — CLINICAL SUPPORT (OUTPATIENT)
Dept: FAMILY MEDICINE | Facility: CLINIC | Age: 59
End: 2024-01-08
Payer: COMMERCIAL

## 2024-01-08 DIAGNOSIS — E11.42 DIABETIC POLYNEUROPATHY ASSOCIATED WITH TYPE 2 DIABETES MELLITUS: Primary | Chronic | ICD-10-CM

## 2024-01-08 DIAGNOSIS — E78.5 HYPERLIPIDEMIA, UNSPECIFIED HYPERLIPIDEMIA TYPE: Chronic | ICD-10-CM

## 2024-01-08 DIAGNOSIS — E66.01 SEVERE OBESITY (BMI 35.0-39.9) WITH COMORBIDITY: Chronic | ICD-10-CM

## 2024-01-08 DIAGNOSIS — F40.10 SOCIAL ANXIETY DISORDER: Chronic | ICD-10-CM

## 2024-01-08 LAB — HBA1C MFR BLD: 7.8 % (ref 4–6)

## 2024-01-08 PROCEDURE — 99214 OFFICE O/P EST MOD 30 MIN: CPT | Mod: 25,S$GLB,, | Performed by: NURSE PRACTITIONER

## 2024-01-08 PROCEDURE — 83036 HEMOGLOBIN GLYCOSYLATED A1C: CPT | Mod: QW,S$GLB,, | Performed by: NURSE PRACTITIONER

## 2024-01-08 NOTE — PROGRESS NOTES
Subjective:       Patient ID: Margaret Pate is a 58 y.o. female.    Chief Complaint: No chief complaint on file.    F/u DM2. Her last A1c was 8.5%; today her A1c is 7.8%. She is due for diabetic eye exam-- she reports that she had been doing eye exam every 2 years for corrective lenses; she was not aware diabetic eye exam are recommended yearly.       Review of Systems   Constitutional:  Negative for fatigue and fever.   Respiratory:  Negative for chest tightness and shortness of breath.    Cardiovascular:  Negative for chest pain and palpitations.   Gastrointestinal:  Negative for abdominal pain, nausea and vomiting.   Skin:  Negative for rash.   Neurological:  Negative for dizziness, light-headedness and headaches.           Past Medical History:  Past Medical History:   Diagnosis Date    BMI 37.0-37.9, adult     DM (diabetes mellitus)     TYPE 2    Elevated blood-pressure reading without diagnosis of hypertension     Hypothyroidism     Perimenopausal disorder     Personal history of colon cancer     Personal history of colonic polyps       Past Surgical History:   Procedure Laterality Date     SECTION      x 2    COLONOSCOPY      LAPAROSCOPIC GASTRIC BANDING      RIGHT HEMICOLECTOMY  2022    1.7 cm colon adenocarcinoma, 0/47 LN. pT2 pN0 pG2-3 pR0.      Review of patient's allergies indicates:  No Known Allergies   Current Outpatient Medications   Medication Sig Dispense Refill    atorvastatin (LIPITOR) 40 MG tablet TAKE 1 TABLET DAILY 90 tablet 3    busPIRone (BUSPAR) 7.5 MG tablet Take 1 tablet (7.5 mg total) by mouth 3 (three) times daily as needed (anxiety). 90 tablet 5    FREESTYLE MARIBEL 3 SENSOR Leticia CHECK BLOOD GLUCOSE THREE TIMES DAILY. CHANGE SENSOR EVERY 14 DAYS 1 each 0    glipiZIDE (GLUCOTROL) 10 MG tablet Take 1 tablet (10 mg total) by mouth 2 (two) times daily with meals. 180 tablet 1    levothyroxine (SYNTHROID) 25 MCG tablet Take 1 tablet by mouth every morning.      LIDOcaine (LIDODERM) 5  % Place 1 patch onto the skin once daily. Remove & Discard patch within 12 hours or as directed by MD 30 patch 2    meloxicam (MOBIC) 15 MG tablet Take 1 tablet (15 mg total) by mouth once daily. 90 tablet 0    ondansetron (ZOFRAN-ODT) 4 MG TbDL Take 2 tablets (8 mg total) by mouth every 8 (eight) hours as needed (nausea). 4 tablet 0    pantoprazole (PROTONIX) 40 MG tablet Take 1 tablet (40 mg total) by mouth once daily. 90 tablet 3    pioglitazone (ACTOS) 45 MG tablet Take 1 tablet by mouth every morning.      sod sulf-pot chloride-mag sulf (SUTAB) 1.479-0.188- 0.225 gram tablet Take 12 tablets by mouth once daily. Take according to package instructions with indicated amount of water. No breakfast day before test. May substitute with Suprep, Clenpiq, Plenvu, Moviprep or GoLytely based on Rx plan and patient preference. 24 tablet 0    tirzepatide 15 mg/0.5 mL PnIj Inject 15 mg into the skin every 7 days. 4 pen 11    venlafaxine (EFFEXOR-XR) 75 MG 24 hr capsule TAKE 1 CAPSULE DAILY 90 capsule 3     No current facility-administered medications for this visit.     Social History     Socioeconomic History    Marital status:    Tobacco Use    Smoking status: Former    Smokeless tobacco: Never   Substance and Sexual Activity    Alcohol use: Never    Drug use: Never      Family History   Problem Relation Age of Onset    Diabetes Mother     Hypertension Mother     Diabetes Father     Colon cancer Father     Stroke Sister         Objective:      Physical Exam  Constitutional:       Appearance: She is well-developed. She is obese.   HENT:      Head: Normocephalic and atraumatic.   Eyes:      General: No scleral icterus.     Conjunctiva/sclera: Conjunctivae normal.      Pupils: Pupils are equal, round, and reactive to light.   Neck:      Thyroid: No thyroid mass.      Vascular: No carotid bruit.      Trachea: Trachea normal.   Cardiovascular:      Rate and Rhythm: Normal rate and regular rhythm.   Pulmonary:       Effort: Pulmonary effort is normal.      Breath sounds: Normal breath sounds.   Musculoskeletal:      Cervical back: Normal range of motion and neck supple.   Neurological:      Mental Status: She is alert and oriented to person, place, and time.   Psychiatric:         Mood and Affect: Mood normal.         Behavior: Behavior normal.         Assessment:     1. Diabetic polyneuropathy associated with type 2 diabetes mellitus Stable   2. Severe obesity (BMI 35.0-39.9) with comorbidity Active   3. Hyperlipidemia, unspecified hyperlipidemia type Stable   4. Social anxiety disorder Stable     Plan:       PROBLEM LIST     Diabetic polyneuropathy associated with type 2 diabetes mellitus  Comments:  A1c 7.8% today. Arranging diabetic eye exam at [facility].  Orders:  -     Hemoglobin A1C, POCT  -     tirzepatide 15 mg/0.5 mL PnIj; Inject 15 mg into the skin every 7 days.  Dispense: 4 pen ; Refill: 11  -     Ambulatory referral/consult to Ophthalmology; Future; Expected date: 01/15/2024    Severe obesity (BMI 35.0-39.9) with comorbidity  Comments:  adhere to ADA diet and recommend walking 30 min daily. Increasing her Mounjaro today.    Hyperlipidemia, unspecified hyperlipidemia type  Comments:  FLP in 6 mo    Social anxiety disorder  Comments:  stable w/ venlafaxine and buspirone

## 2024-02-12 RX ORDER — BLOOD-GLUCOSE SENSOR
EACH MISCELLANEOUS
Qty: 1 EACH | Refills: 0 | Status: SHIPPED | OUTPATIENT
Start: 2024-02-12

## 2024-04-11 DIAGNOSIS — E11.42 DIABETIC POLYNEUROPATHY ASSOCIATED WITH TYPE 2 DIABETES MELLITUS: Chronic | ICD-10-CM

## 2024-04-15 DIAGNOSIS — F40.10 SOCIAL ANXIETY DISORDER: Primary | ICD-10-CM

## 2024-04-15 RX ORDER — VENLAFAXINE HYDROCHLORIDE 75 MG/1
75 CAPSULE, EXTENDED RELEASE ORAL DAILY
Qty: 90 CAPSULE | Refills: 3 | Status: SHIPPED | OUTPATIENT
Start: 2024-04-15 | End: 2024-04-15 | Stop reason: SDUPTHER

## 2024-04-15 RX ORDER — VENLAFAXINE HYDROCHLORIDE 75 MG/1
75 CAPSULE, EXTENDED RELEASE ORAL DAILY
Qty: 30 CAPSULE | Refills: 0 | Status: SHIPPED | OUTPATIENT
Start: 2024-04-15 | End: 2024-06-13

## 2024-05-07 DIAGNOSIS — E78.5 HYPERLIPIDEMIA, UNSPECIFIED HYPERLIPIDEMIA TYPE: ICD-10-CM

## 2024-05-07 RX ORDER — ATORVASTATIN CALCIUM 40 MG/1
TABLET, FILM COATED ORAL
Qty: 90 TABLET | Refills: 3 | Status: SHIPPED | OUTPATIENT
Start: 2024-05-07

## 2024-05-28 RX ORDER — PIOGLITAZONEHYDROCHLORIDE 45 MG/1
45 TABLET ORAL
Qty: 90 TABLET | Refills: 3 | Status: SHIPPED | OUTPATIENT
Start: 2024-05-28

## 2024-06-03 ENCOUNTER — PATIENT MESSAGE (OUTPATIENT)
Dept: FAMILY MEDICINE | Facility: CLINIC | Age: 59
End: 2024-06-03
Payer: COMMERCIAL

## 2024-06-04 DIAGNOSIS — E11.42 DIABETIC POLYNEUROPATHY ASSOCIATED WITH TYPE 2 DIABETES MELLITUS: Primary | Chronic | ICD-10-CM

## 2024-06-11 DIAGNOSIS — F40.10 SOCIAL ANXIETY DISORDER: ICD-10-CM

## 2024-06-13 RX ORDER — VENLAFAXINE HYDROCHLORIDE 75 MG/1
75 CAPSULE, EXTENDED RELEASE ORAL
Qty: 90 CAPSULE | Refills: 3 | Status: SHIPPED | OUTPATIENT
Start: 2024-06-13

## 2024-06-28 DIAGNOSIS — E11.42 DIABETIC POLYNEUROPATHY ASSOCIATED WITH TYPE 2 DIABETES MELLITUS: Chronic | ICD-10-CM

## 2024-07-03 ENCOUNTER — PATIENT OUTREACH (OUTPATIENT)
Dept: ADMINISTRATIVE | Facility: HOSPITAL | Age: 59
End: 2024-07-03
Payer: COMMERCIAL

## 2024-07-03 NOTE — PROGRESS NOTES
VBHM Score: 6     Cervical Cancer Screening  Urine Screening  Eye Exam  Lipid Panel  Mammogram  Foot Exam                       Health Maintenance Topic(s) Outreach Outcomes & Actions Taken:    Cervical Cancer Screening - Outreach Outcomes & Actions Taken  : Overdue    Lab(s) - Outreach Outcomes & Actions Taken  : Overdue    Eye Exam - Outreach Outcomes & Actions Taken  : Overdue    Breast Cancer Screening - Outreach Outcomes & Actions Taken  : Overdue    Diabetic Foot Exam - Outreach Outcomes & Actions Taken  : Overdue         Additional Notes:  Spoke to pt, she has not had any of the above screenings, she declined scheduling annual visit, states she will self schedule and speak with Mrs Cullen at visit on above needed HM topics.               Care Management, Digital Medicine, and/or Education Referrals    OPCM Risk Score: 15.4         Next Steps - Referral Actions: Declined services

## 2024-07-31 ENCOUNTER — PATIENT MESSAGE (OUTPATIENT)
Dept: GASTROENTEROLOGY | Facility: CLINIC | Age: 59
End: 2024-07-31
Payer: COMMERCIAL

## 2024-07-31 NOTE — TELEPHONE ENCOUNTER
You may try any type of OTC probiotic if having any GI issues such as abdominal pain/bloating. It can improve your symptoms by taking daily but if not having any symptoms, probiotics may not be necessary.   Kn

## 2024-09-13 ENCOUNTER — PATIENT MESSAGE (OUTPATIENT)
Dept: PRIMARY CARE CLINIC | Facility: CLINIC | Age: 59
End: 2024-09-13
Payer: COMMERCIAL

## 2024-09-20 ENCOUNTER — TELEPHONE (OUTPATIENT)
Dept: GASTROENTEROLOGY | Facility: CLINIC | Age: 59
End: 2024-09-20
Payer: COMMERCIAL

## 2024-09-20 NOTE — PROGRESS NOTES
Clinic Note    Reason for visit:  The primary encounter diagnosis was Constipation, unspecified constipation type. Diagnoses of Malignant neoplasm of transverse colon, Encounter for follow-up surveillance of colon cancer, History of colon polyps, and History of colon cancer were also pertinent to this visit.    PCP: Kathy Cullen       HPI:  This is a 59 y.o. female who is here for a follow up. Patient with h/o colon cancer s/p resection of transverse colon in 12/2022. She had a foot of colon removed and lymph nodes were negative. She is due for repeat colonoscopy 11/2024. She has constipation at baseline and takes MiraLAX as needed but makes stool very soft and hard to clean after. She has generalized abd pain associated with constipation and states pain improves after a BM. The pain has been occurring with constipation since her colon resection. No blood in stool.       Colonoscopy 11/6/2023: Healthy-appearing ileocolonic anastomosis in right colon. IH. 1 TA, repeat colonoscopy in one year.     Right hemicolectomy pathology report: 1.7 cm, adenocarcinoma, 0/47 LN. pT2 pN0 pG2-3 pR0.     Review of Systems   Constitutional:  Negative for fatigue, fever and unexpected weight change.   HENT:  Negative for mouth sores, postnasal drip, sore throat and trouble swallowing.    Eyes:  Negative for pain, discharge and eye dryness.   Respiratory:  Negative for apnea, cough, choking, chest tightness, shortness of breath and wheezing.    Cardiovascular:  Negative for chest pain, palpitations and leg swelling.   Gastrointestinal:  Positive for constipation. Negative for abdominal distention, abdominal pain, anal bleeding, blood in stool, change in bowel habit, diarrhea, nausea, rectal pain, vomiting, reflux and fecal incontinence.   Genitourinary:  Negative for bladder incontinence, dysuria and hematuria.   Musculoskeletal:  Negative for arthralgias, back pain and joint swelling.   Integumentary:  Negative for color change  and rash.   Allergic/Immunologic: Negative for environmental allergies and food allergies.   Neurological:  Negative for seizures and headaches.   Hematological:  Negative for adenopathy. Does not bruise/bleed easily.        Past Medical History:   Diagnosis Date    BMI 37.0-37.9, adult     DM (diabetes mellitus)     TYPE 2    Elevated blood-pressure reading without diagnosis of hypertension     Hypothyroidism     Perimenopausal disorder     Personal history of colon cancer     Personal history of colonic polyps      Past Surgical History:   Procedure Laterality Date     SECTION      x 2    COLONOSCOPY      LAPAROSCOPIC GASTRIC BANDING      RIGHT HEMICOLECTOMY  2022    1.7 cm colon adenocarcinoma, 0/47 LN. pT2 pN0 pG2-3 pR0.     Family History   Problem Relation Name Age of Onset    Diabetes Mother      Hypertension Mother      Diabetes Father      Colon cancer Father      Stroke Sister       Social History     Tobacco Use    Smoking status: Former    Smokeless tobacco: Never   Substance Use Topics    Alcohol use: Never    Drug use: Never     Review of patient's allergies indicates:  No Known Allergies   Medication List with Changes/Refills   New Medications    ONDANSETRON (ZOFRAN-ODT) 8 MG TBDL    Take 1 tablet (8 mg total) by mouth every 8 (eight) hours as needed (Nausea).    SOD SULF-POT CHLORIDE-MAG SULF (SUTAB) 1.479-0.188- 0.225 GRAM TABLET    Take according to package instructions with indicated amount of water. No breakfast day before test. May substitute with Suprep, Clenpiq, Plenvu, Moviprep or GoLytely based on Rx plan and patient preference.   Current Medications    ATORVASTATIN (LIPITOR) 40 MG TABLET    TAKE 1 TABLET DAILY    BUSPIRONE (BUSPAR) 7.5 MG TABLET    Take 1 tablet (7.5 mg total) by mouth 3 (three) times daily as needed (anxiety).    FREESTYLE MARIBEL 3 SENSOR TODD    APPLY 1 SENSOR EVERY 14 DAYS TO CHECK BLOOD GLUCOSE 3 TIMES DAILY.    GLIPIZIDE (GLUCOTROL) 10 MG TABLET    Take 1  "tablet (10 mg total) by mouth 2 (two) times daily with meals.    LEVOTHYROXINE (SYNTHROID) 25 MCG TABLET    Take 1 tablet by mouth every morning.    PIOGLITAZONE (ACTOS) 45 MG TABLET    TAKE 1 TABLET DAILY    TIRZEPATIDE 15 MG/0.5 ML PNIJ    Inject 15 mg into the skin every 7 days.    VENLAFAXINE (EFFEXOR-XR) 75 MG 24 HR CAPSULE    TAKE 1 CAPSULE DAILY   Discontinued Medications    LIDOCAINE (LIDODERM) 5 %    Place 1 patch onto the skin once daily. Remove & Discard patch within 12 hours or as directed by MD    MELOXICAM (MOBIC) 15 MG TABLET    Take 1 tablet (15 mg total) by mouth once daily.    ONDANSETRON (ZOFRAN-ODT) 4 MG TBDL    Take 2 tablets (8 mg total) by mouth every 8 (eight) hours as needed (nausea).    PANTOPRAZOLE (PROTONIX) 40 MG TABLET    Take 1 tablet (40 mg total) by mouth once daily.    SOD SULF-POT CHLORIDE-MAG SULF (SUTAB) 1.479-0.188- 0.225 GRAM TABLET    Take 12 tablets by mouth once daily. Take according to package instructions with indicated amount of water. No breakfast day before test. May substitute with Suprep, Clenpiq, Plenvu, Moviprep or GoLytely based on Rx plan and patient preference.         Vital Signs:  /78 (BP Location: Right arm, Patient Position: Sitting, BP Method: Medium (Automatic))   Pulse 78   Resp 16   Ht 5' 7" (1.702 m)   Wt 99.2 kg (218 lb 12.8 oz)   SpO2 99%   BMI 34.27 kg/m²        Physical Exam  Vitals reviewed.   Constitutional:       General: She is awake. She is not in acute distress.     Appearance: Normal appearance. She is well-developed. She is not ill-appearing, toxic-appearing or diaphoretic.   HENT:      Head: Normocephalic and atraumatic.      Nose: Nose normal.      Mouth/Throat:      Mouth: Mucous membranes are moist.      Pharynx: Oropharynx is clear. No oropharyngeal exudate or posterior oropharyngeal erythema.   Eyes:      General: Lids are normal. Gaze aligned appropriately. No scleral icterus.        Right eye: No discharge.         Left " eye: No discharge.      Conjunctiva/sclera: Conjunctivae normal.   Neck:      Trachea: Trachea normal.   Cardiovascular:      Rate and Rhythm: Normal rate and regular rhythm.      Pulses:           Radial pulses are 2+ on the right side and 2+ on the left side.   Pulmonary:      Effort: Pulmonary effort is normal. No respiratory distress.      Breath sounds: No stridor. No wheezing.   Chest:      Chest wall: No tenderness.   Abdominal:      General: Bowel sounds are normal. There is no distension.      Palpations: Abdomen is soft. There is no fluid wave, hepatomegaly or mass.      Tenderness: There is no abdominal tenderness. There is no guarding or rebound.   Musculoskeletal:         General: No tenderness or deformity.      Cervical back: Full passive range of motion without pain and neck supple. No tenderness.      Right lower leg: No edema.      Left lower leg: No edema.   Lymphadenopathy:      Cervical: No cervical adenopathy.   Skin:     General: Skin is warm and dry.      Capillary Refill: Capillary refill takes less than 2 seconds.      Coloration: Skin is not cyanotic, jaundiced or pale.   Neurological:      General: No focal deficit present.      Mental Status: She is alert and oriented to person, place, and time.      Motor: No tremor.   Psychiatric:         Attention and Perception: Attention normal.         Mood and Affect: Mood and affect normal.         Speech: Speech normal.         Behavior: Behavior normal. Behavior is cooperative.            All of the data above and below has been reviewed by myself and any further interpretations will be reflected in the assessment and plan.   The data includes review of external notes, and independent interpretation of lab results, procedures, x-rays, and imaging reports.      Assessment:  Constipation, unspecified constipation type    Malignant neoplasm of transverse colon    Encounter for follow-up surveillance of colon cancer  -     Ambulatory Referral to  External Surgery    History of colon polyps  -     Ambulatory Referral to External Surgery    History of colon cancer  -     Ambulatory Referral to External Surgery    Other orders  -     ondansetron (ZOFRAN-ODT) 8 MG TbDL; Take 1 tablet (8 mg total) by mouth every 8 (eight) hours as needed (Nausea).  Dispense: 2 tablet; Refill: 0  -     sod sulf-pot chloride-mag sulf (SUTAB) 1.479-0.188- 0.225 gram tablet; Take according to package instructions with indicated amount of water. No breakfast day before test. May substitute with Suprep, Clenpiq, Plenvu, Moviprep or GoLytely based on Rx plan and patient preference.  Dispense: 24 tablet; Refill: 0    Repeat colon due 11/2024. Requests Zofran to take the day of colon prep.  Constipation. MiraLAX makes stool too loose. Trial of fiber. Discussed pelvic floor therapy. She would like to try fiber first.     Recommendations:  Schedule colonoscopy with Dr. Peña. Stop José Miguelro 7 days prior.  Begin taking fiber gummies daily. Start with 2 daily and may increase to 4 daily after a few weeks if doing well.       Risks, benefits, and alternatives of medical management, any associated procedures, and/or treatment discussed with the patient. Patient given opportunity to ask questions and voices understanding. Patient has elected to proceed with the recommended care modalities as discussed.    Follow up in about 1 year (around 9/23/2025). With MD    Order summary:  Orders Placed This Encounter   Procedures    Ambulatory Referral to External Surgery        Instructed patient to notify my office if they have not been contacted within two weeks after any procedures, submitting any samples (biopsies, blood, stool, urine, etc.) or after any imaging (X-ray, CT, MRI, etc.).      Erin Smith NP    This document may have been created using a voice recognition transcribing system. Incorrect words or phrases may have been missed during proofreading. Please interpret accordingly or contact  me for clarification.

## 2024-09-20 NOTE — TELEPHONE ENCOUNTER
----- Message from Huy Jerome sent at 9/20/2024  9:33 AM CDT -----  Contact: [phone]  Type:  Needs Medical Advice    Who Called: Margaret  Symptoms (please be specific): reschedule appt    Would the patient rather a call back or a response via [facility]? Call back   Best Call Back Number: [phone]  Additional Information: schedule isnt populating , pt is needing to reschedule 9/23/2024 appt

## 2024-09-20 NOTE — TELEPHONE ENCOUNTER
Returned pt call. 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 I offered pt 8:40, she stated that would work for her. I updated epic schedule. cecile

## 2024-09-23 ENCOUNTER — OFFICE VISIT (OUTPATIENT)
Dept: GASTROENTEROLOGY | Facility: CLINIC | Age: 59
End: 2024-09-23
Payer: COMMERCIAL

## 2024-09-23 VITALS
SYSTOLIC BLOOD PRESSURE: 125 MMHG | HEIGHT: 67 IN | BODY MASS INDEX: 34.34 KG/M2 | DIASTOLIC BLOOD PRESSURE: 78 MMHG | RESPIRATION RATE: 16 BRPM | WEIGHT: 218.81 LBS | HEART RATE: 78 BPM | OXYGEN SATURATION: 99 %

## 2024-09-23 DIAGNOSIS — Z86.010 HISTORY OF COLON POLYPS: ICD-10-CM

## 2024-09-23 DIAGNOSIS — K59.00 CONSTIPATION, UNSPECIFIED CONSTIPATION TYPE: Primary | ICD-10-CM

## 2024-09-23 DIAGNOSIS — C18.4 MALIGNANT NEOPLASM OF TRANSVERSE COLON: ICD-10-CM

## 2024-09-23 DIAGNOSIS — Z08 ENCOUNTER FOR FOLLOW-UP SURVEILLANCE OF COLON CANCER: ICD-10-CM

## 2024-09-23 DIAGNOSIS — Z85.038 HISTORY OF COLON CANCER: ICD-10-CM

## 2024-09-23 DIAGNOSIS — Z85.038 ENCOUNTER FOR FOLLOW-UP SURVEILLANCE OF COLON CANCER: ICD-10-CM

## 2024-09-23 PROCEDURE — 3074F SYST BP LT 130 MM HG: CPT | Mod: CPTII,S$GLB,,

## 2024-09-23 PROCEDURE — 1159F MED LIST DOCD IN RCRD: CPT | Mod: CPTII,S$GLB,,

## 2024-09-23 PROCEDURE — 99214 OFFICE O/P EST MOD 30 MIN: CPT | Mod: S$GLB,,,

## 2024-09-23 PROCEDURE — 3008F BODY MASS INDEX DOCD: CPT | Mod: CPTII,S$GLB,,

## 2024-09-23 PROCEDURE — 1160F RVW MEDS BY RX/DR IN RCRD: CPT | Mod: CPTII,S$GLB,,

## 2024-09-23 PROCEDURE — 3051F HG A1C>EQUAL 7.0%<8.0%: CPT | Mod: CPTII,S$GLB,,

## 2024-09-23 PROCEDURE — 3078F DIAST BP <80 MM HG: CPT | Mod: CPTII,S$GLB,,

## 2024-09-23 RX ORDER — SOD SULF/POT CHLORIDE/MAG SULF 1.479 G
TABLET ORAL
Qty: 24 TABLET | Refills: 0 | Status: SHIPPED | OUTPATIENT
Start: 2024-09-23

## 2024-09-23 RX ORDER — ONDANSETRON 8 MG/1
8 TABLET, ORALLY DISINTEGRATING ORAL EVERY 8 HOURS PRN
Qty: 2 TABLET | Refills: 0 | Status: SHIPPED | OUTPATIENT
Start: 2024-09-23

## 2024-09-23 NOTE — PATIENT INSTRUCTIONS
Schedule colonoscopy with Dr. Peña.   Begin taking fiber gummies daily. Start with 2 daily and may increase to 4 daily after a few weeks if doing well.     Please notify my office if you have not been contacted within two weeks after any procedures, submitting any samples (biopsies, blood, stool, urine, etc.) or after any imaging (X-ray, CT, MRI, etc.).

## 2024-12-02 ENCOUNTER — TELEPHONE (OUTPATIENT)
Dept: GASTROENTEROLOGY | Facility: CLINIC | Age: 59
End: 2024-12-02
Payer: COMMERCIAL

## 2024-12-02 VITALS — HEIGHT: 67 IN | BODY MASS INDEX: 34.21 KG/M2 | WEIGHT: 218 LBS

## 2024-12-02 DIAGNOSIS — Z08 ENCOUNTER FOR FOLLOW-UP SURVEILLANCE OF COLON CANCER: Primary | ICD-10-CM

## 2024-12-02 DIAGNOSIS — Z85.038 HISTORY OF COLON CANCER: ICD-10-CM

## 2024-12-02 DIAGNOSIS — Z85.038 ENCOUNTER FOR FOLLOW-UP SURVEILLANCE OF COLON CANCER: Primary | ICD-10-CM

## 2024-12-02 DIAGNOSIS — Z86.0100 HISTORY OF COLON POLYPS: ICD-10-CM

## 2024-12-02 NOTE — TELEPHONE ENCOUNTER
"[facility]  [address] [place] [address]  Phone: [phone]  Fax: [phone]    History & Physical         Provider: Dr. Jesi Peña    Patient Name: Margaret KWOK (age):1965  59 y.o.           Gender: female   Phone: [phone]     Referring Physician: Kathy Cullen     Vital Signs:   Height - 5' 7"  Weight - 218 lb  BMI -  34.14    Plan: Colonoscopy @ [facility]    Encounter Diagnoses   Name Primary?    Encounter for follow-up surveillance of colon cancer Yes    History of colon polyps     History of colon cancer            History:      Past Medical History:   Diagnosis Date    DM (diabetes mellitus)     TYPE 2    Elevated blood-pressure reading without diagnosis of hypertension     Hypothyroidism     Obesity, Class I, BMI 30.0-34.9 (see actual BMI)     Perimenopausal disorder     Personal history of colon cancer     Personal history of colonic polyps       Past Surgical History:   Procedure Laterality Date     SECTION      x 2    COLONOSCOPY      LAPAROSCOPIC GASTRIC BANDING      RIGHT HEMICOLECTOMY  2022    1.7 cm colon adenocarcinoma, 0/47 LN. pT2 pN0 pG2-3 pR0.      Medication List with Changes/Refills   Current Medications    ATORVASTATIN (LIPITOR) 40 MG TABLET    TAKE 1 TABLET DAILY    BUSPIRONE (BUSPAR) 7.5 MG TABLET    Take 1 tablet (7.5 mg total) by mouth 3 (three) times daily as needed (anxiety).    FREESTYLE MARIBEL 3 SENSOR TODD    APPLY 1 SENSOR EVERY 14 DAYS TO CHECK BLOOD GLUCOSE 3 TIMES DAILY.    GLIPIZIDE (GLUCOTROL) 10 MG TABLET    Take 1 tablet (10 mg total) by mouth 2 (two) times daily with meals.    LEVOTHYROXINE (SYNTHROID) 25 MCG TABLET    Take 1 tablet by mouth every morning.    ONDANSETRON (ZOFRAN-ODT) 8 MG TBDL    Take 1 tablet (8 mg total) by mouth every 8 (eight) hours as needed (Nausea).    PIOGLITAZONE (ACTOS) 45 MG TABLET    TAKE 1 TABLET DAILY    " SOD SULF-POT CHLORIDE-MAG SULF (SUTAB) 1.479-0.188- 0.225 GRAM TABLET    Take according to package instructions with indicated amount of water. No breakfast day before test. May substitute with Suprep, Clenpiq, Plenvu, Moviprep or GoLytely based on Rx plan and patient preference.    TIRZEPATIDE 15 MG/0.5 ML PNIJ    Inject 15 mg into the skin every 7 days.    VENLAFAXINE (EFFEXOR-XR) 75 MG 24 HR CAPSULE    TAKE 1 CAPSULE DAILY      Review of patient's allergies indicates:  No Known Allergies   Family History   Problem Relation Name Age of Onset    Diabetes Mother      Hypertension Mother      Diabetes Father      Colon cancer Father      Stroke Sister        Social History     Tobacco Use    Smoking status: Former    Smokeless tobacco: Never   Substance Use Topics    Alcohol use: Never    Drug use: Never        Physical Examination:     General Appearance:___________________________  HEENT: _____________________________________  Abdomen:____________________________________  Heart:________________________________________  Lungs:_______________________________________  Extremities:___________________________________  Skin:_________________________________________  Endocrine:____________________________________  Genitourinary:_________________________________  Neurological:__________________________________      Patient has been evaluated immediately prior to sedation and is medically cleared for endoscopy with IVCS as an ASA class: ______      Physician Signature: _________________________       Date: ________  Time: ________

## 2024-12-02 NOTE — TELEPHONE ENCOUNTER
S/w pt and told her that I was calling as a courtesy regarding up coming Colon with NBP on 12/9/24, Monday and wanted to verify that she has her paper prep instructions and meds. Pt stated she still needs to pickup her meds. I also mentioned that [facility] will call the day before (FRI) with the arrival time, GI Lab is located on the third floor, and to pre-register anytime this week. cecile

## 2024-12-06 ENCOUNTER — PATIENT MESSAGE (OUTPATIENT)
Dept: FAMILY MEDICINE | Facility: CLINIC | Age: 59
End: 2024-12-06
Payer: COMMERCIAL

## 2024-12-06 DIAGNOSIS — E11.49 DIABETES MELLITUS TYPE 2 WITH NEUROLOGICAL MANIFESTATIONS: Primary | ICD-10-CM

## 2024-12-06 RX ORDER — GLIPIZIDE 10 MG/1
10 TABLET ORAL 2 TIMES DAILY WITH MEALS
Qty: 180 TABLET | Refills: 1 | Status: SHIPPED | OUTPATIENT
Start: 2024-12-06

## 2024-12-09 ENCOUNTER — OUTSIDE PLACE OF SERVICE (OUTPATIENT)
Dept: GASTROENTEROLOGY | Facility: CLINIC | Age: 59
End: 2024-12-09

## 2025-01-09 ENCOUNTER — PATIENT MESSAGE (OUTPATIENT)
Dept: FAMILY MEDICINE | Facility: CLINIC | Age: 60
End: 2025-01-09
Payer: COMMERCIAL

## 2025-01-27 PROBLEM — E11.3293 MILD NONPROLIFERATIVE DIABETIC RETINOPATHY OF BOTH EYES WITHOUT MACULAR EDEMA ASSOCIATED WITH TYPE 2 DIABETES MELLITUS: Status: ACTIVE | Noted: 2025-01-27

## 2025-01-27 LAB
LEFT EYE DM RETINOPATHY: POSITIVE
RIGHT EYE DM RETINOPATHY: POSITIVE

## 2025-02-07 ENCOUNTER — OFFICE VISIT (OUTPATIENT)
Dept: FAMILY MEDICINE | Facility: CLINIC | Age: 60
End: 2025-02-07
Payer: COMMERCIAL

## 2025-02-07 VITALS
RESPIRATION RATE: 16 BRPM | SYSTOLIC BLOOD PRESSURE: 128 MMHG | BODY MASS INDEX: 35.31 KG/M2 | HEIGHT: 67 IN | WEIGHT: 225 LBS | HEART RATE: 76 BPM | OXYGEN SATURATION: 97 % | DIASTOLIC BLOOD PRESSURE: 68 MMHG | TEMPERATURE: 98 F

## 2025-02-07 DIAGNOSIS — D64.9 ANEMIA, UNSPECIFIED TYPE: ICD-10-CM

## 2025-02-07 DIAGNOSIS — E66.09 CLASS 2 OBESITY DUE TO EXCESS CALORIES WITHOUT SERIOUS COMORBIDITY WITH BODY MASS INDEX (BMI) OF 35.0 TO 35.9 IN ADULT: Chronic | ICD-10-CM

## 2025-02-07 DIAGNOSIS — Z12.31 BREAST CANCER SCREENING BY MAMMOGRAM: ICD-10-CM

## 2025-02-07 DIAGNOSIS — E11.3293 TYPE 2 DIABETES MELLITUS WITH BOTH EYES AFFECTED BY MILD NONPROLIFERATIVE RETINOPATHY WITHOUT MACULAR EDEMA, WITHOUT LONG-TERM CURRENT USE OF INSULIN: Primary | ICD-10-CM

## 2025-02-07 DIAGNOSIS — E11.42 DIABETIC POLYNEUROPATHY ASSOCIATED WITH TYPE 2 DIABETES MELLITUS: Chronic | ICD-10-CM

## 2025-02-07 DIAGNOSIS — E66.812 CLASS 2 OBESITY DUE TO EXCESS CALORIES WITHOUT SERIOUS COMORBIDITY WITH BODY MASS INDEX (BMI) OF 35.0 TO 35.9 IN ADULT: Chronic | ICD-10-CM

## 2025-02-07 DIAGNOSIS — Z23 NEED FOR PROPHYLACTIC VACCINATION WITH STREPTOCOCCUS PNEUMONIAE (PNEUMOCOCCUS) AND INFLUENZA VACCINES: ICD-10-CM

## 2025-02-07 DIAGNOSIS — E55.9 VITAMIN D DEFICIENCY: ICD-10-CM

## 2025-02-07 DIAGNOSIS — E78.2 MIXED HYPERLIPIDEMIA: Chronic | ICD-10-CM

## 2025-02-07 DIAGNOSIS — R53.83 FATIGUE, UNSPECIFIED TYPE: Chronic | ICD-10-CM

## 2025-02-07 LAB
% SATURATION: 14 % (ref 20–50)
25(OH)D3 SERPL-MCNC: 10 NG/ML
ABS NRBC COUNT: 0 THOU/UL (ref 0–0.01)
ABSOLUTE BASOPHIL: 0 10*3/UL (ref 0–0.3)
ABSOLUTE EOSINOPHIL: 0.2 10*3/UL (ref 0–0.6)
ABSOLUTE IMMATURE GRAN: 0.02 THOU/UL (ref 0–0.03)
ABSOLUTE LYMPHOCYTE: 1.6 10*3/UL (ref 1.2–4)
ABSOLUTE MONOCYTE: 0.5 10*3/UL (ref 0.1–0.8)
ALBUMIN SERPL BCP-MCNC: 4 G/DL (ref 3.4–5)
ALBUMIN/GLOBULIN RATIO: 1 RATIO (ref 1.1–1.8)
ALP SERPL-CCNC: 88 U/L (ref 45–117)
ALT SERPL W P-5'-P-CCNC: 17 U/L (ref 13–56)
ANION GAP SERPL CALC-SCNC: 10 MMOL/L (ref 3–11)
AST SERPL-CCNC: 22 U/L (ref 15–37)
BASOPHILS NFR BLD: 0.3 % (ref 0–3)
BILIRUB SERPL-MCNC: 0.7 MG/DL (ref 0.2–1)
BUN SERPL-MCNC: 19 MG/DL (ref 7–18)
BUN/CREAT SERPL: 23.17 RATIO
CALCIUM SERPL-MCNC: 10.1 MG/DL (ref 8.5–10.1)
CHLORIDE SERPL-SCNC: 103 MMOL/L (ref 98–107)
CHOLEST SERPL-MSCNC: 251 MG/DL
CO2 SERPL-SCNC: 25 MMOL/L (ref 21–32)
CREAT SERPL-MCNC: 0.82 MG/DL (ref 0.55–1.02)
EOSINOPHIL NFR BLD: 3.8 % (ref 0–6)
ERYTHROCYTE [DISTWIDTH] IN BLOOD BY AUTOMATED COUNT: 12.4 % (ref 0–15.5)
ESTIMATED AVG GLUCOSE: 111 MG/DL
FOLATE: 15.6 NG/ML (ref 3.1–17.5)
GFR ESTIMATION: 82
GLOBULIN: 4.1 G/DL (ref 2.3–3.5)
GLUCOSE SERPL-MCNC: 61 MG/DL (ref 74–106)
HBA1C MFR BLD: 5.3 % (ref 4.2–6.3)
HCT VFR BLD AUTO: 42.3 % (ref 37–47)
HDL/CHOLESTEROL RATIO: 5.8 RATIO
HDLC SERPL-MCNC: 43 MG/DL
HGB BLD-MCNC: 13.5 G/DL (ref 12–16)
IMMATURE GRANULOCYTES: 0.3 % (ref 0–0.43)
IRON: 54 UG/DL (ref 50–170)
LDLC SERPL CALC-MCNC: 179.6 MG/DL
LYMPHOCYTES NFR BLD: 27.2 % (ref 20–45)
MCH RBC QN AUTO: 30.1 PG (ref 27–32)
MCHC RBC AUTO-ENTMCNC: 31.9 % (ref 32–36)
MCV RBC AUTO: 94.4 FL (ref 80–99)
MONOCYTES NFR BLD: 8.7 % (ref 2–10)
NEUTROPHILS # BLD AUTO: 3.5 10*3/UL (ref 1.4–7)
NEUTROPHILS NFR BLD: 59.7 % (ref 50–80)
NUCLEATED RED BLOOD CELLS: 0 % (ref 0–0.2)
PLATELETS: 230 10*3/UL (ref 130–400)
PMV BLD AUTO: 10.9 FL (ref 9.2–12.2)
POTASSIUM SERPL-SCNC: 4 MMOL/L (ref 3.5–5.1)
PROT SERPL-MCNC: 8.1 G/DL (ref 6.4–8.2)
RBC # BLD AUTO: 4.48 10*6/UL (ref 4.2–5.4)
SODIUM BLD-SCNC: 138 MMOL/L (ref 131–143)
T4 FREE SP9 P CHAL SERPL-SCNC: 1.16 NG/DL (ref 0.76–1.46)
TOTAL IRON BINDING CAPACITY: 394 UG/DL (ref 250–450)
TRIGL SERPL-MCNC: 142 MG/DL (ref 0–149)
TSH SERPL DL<=0.005 MIU/L-ACNC: 2.98 UIU/ML (ref 0.36–3.74)
VITAMIN B12: 275 PG/ML (ref 193–986)
VLDL CHOLESTEROL: 28 MG/DL
WBC # BLD: 5.8 10*3/UL (ref 4.5–10)

## 2025-02-07 PROCEDURE — 3078F DIAST BP <80 MM HG: CPT | Mod: CPTII,,, | Performed by: NURSE PRACTITIONER

## 2025-02-07 PROCEDURE — 99214 OFFICE O/P EST MOD 30 MIN: CPT | Mod: 25,S$PBB,, | Performed by: NURSE PRACTITIONER

## 2025-02-07 PROCEDURE — 1160F RVW MEDS BY RX/DR IN RCRD: CPT | Mod: CPTII,,, | Performed by: NURSE PRACTITIONER

## 2025-02-07 PROCEDURE — 3008F BODY MASS INDEX DOCD: CPT | Mod: CPTII,,, | Performed by: NURSE PRACTITIONER

## 2025-02-07 PROCEDURE — 1159F MED LIST DOCD IN RCRD: CPT | Mod: CPTII,,, | Performed by: NURSE PRACTITIONER

## 2025-02-07 PROCEDURE — 3074F SYST BP LT 130 MM HG: CPT | Mod: CPTII,,, | Performed by: NURSE PRACTITIONER

## 2025-02-07 RX ORDER — SERTRALINE HYDROCHLORIDE 100 MG/1
100 TABLET, FILM COATED ORAL DAILY
COMMUNITY
Start: 2025-02-01

## 2025-02-07 RX ORDER — TIRZEPATIDE 12.5 MG/.5ML
12.5 INJECTION, SOLUTION SUBCUTANEOUS
Qty: 2 ML | Refills: 0 | Status: SHIPPED | OUTPATIENT
Start: 2025-02-07 | End: 2025-03-09

## 2025-02-07 RX ORDER — ALPRAZOLAM 0.25 MG/1
0.25 TABLET ORAL 2 TIMES DAILY PRN
COMMUNITY
Start: 2024-12-03

## 2025-02-07 RX ORDER — ATORVASTATIN CALCIUM 40 MG/1
40 TABLET, FILM COATED ORAL DAILY
Qty: 90 TABLET | Refills: 3 | Status: SHIPPED | OUTPATIENT
Start: 2025-02-07

## 2025-02-07 NOTE — PROGRESS NOTES
Subjective:       Patient ID: Margaret Pate is a 59 y.o. female.    Chief Complaint: Follow-up (Pt is here for a routine check and to get lab work. )    DM2 follow-up  She has been compliant with her diabetic medications. Her last A1c was 7.8% 2024... She stopped taking statin due to fear of side effects. She completed diabetic eye exam 2025 at [facility]. Demonstrated evidence of bilateral diabetic retinopathy. Obtaining A1c and urine microalbumin today.     She reports excessive fatigue; She has history of hypothyroid. She stopped taking her levothyroxine 'a long time ago.' She reports symptoms of fatigue, dry hair, hair loss, and cold intolerance.       Review of Systems   Constitutional:  Positive for fatigue. Negative for fever.   Respiratory:  Negative for chest tightness and shortness of breath.    Cardiovascular:  Negative for chest pain and palpitations.   Gastrointestinal:  Negative for abdominal pain, nausea and vomiting.   Skin:  Negative for rash.   Neurological:  Negative for dizziness, light-headedness and headaches.   Psychiatric/Behavioral:  Negative for dysphoric mood. The patient is nervous/anxious.            Past Medical History:  Past Medical History:   Diagnosis Date    DM (diabetes mellitus)     TYPE 2    Elevated blood-pressure reading without diagnosis of hypertension     Hypothyroidism     Obesity, Class I, BMI 30.0-34.9 (see actual BMI)     Perimenopausal disorder     Personal history of colon cancer     Personal history of colonic polyps       Past Surgical History:   Procedure Laterality Date     SECTION      x 2    COLONOSCOPY      LAPAROSCOPIC GASTRIC BANDING      RIGHT HEMICOLECTOMY  2022    1.7 cm colon adenocarcinoma, 0/47 LN. pT2 pN0 pG2-3 pR0.      Review of patient's allergies indicates:  No Known Allergies   Current Outpatient Medications   Medication Sig Dispense Refill    ALPRAZolam (XANAX) 0.25 MG tablet Take 0.25 mg by mouth 2 (two) times daily as  needed.      sertraline (ZOLOFT) 100 MG tablet Take 100 mg by mouth once daily.      atorvastatin (LIPITOR) 40 MG tablet Take 1 tablet (40 mg total) by mouth once daily. 90 tablet 3    busPIRone (BUSPAR) 7.5 MG tablet Take 1 tablet (7.5 mg total) by mouth 3 (three) times daily as needed (anxiety). 90 tablet 5    FREESTYLE MARIBEL 3 SENSOR Leticia APPLY 1 SENSOR EVERY 14 DAYS TO CHECK BLOOD GLUCOSE 3 TIMES DAILY. 1 each 0    glipiZIDE (GLUCOTROL) 10 MG tablet Take 1 tablet (10 mg total) by mouth 2 (two) times daily with meals. 180 tablet 1    ondansetron (ZOFRAN-ODT) 8 MG TbDL Take 1 tablet (8 mg total) by mouth every 8 (eight) hours as needed (Nausea). 2 tablet 0    pioglitazone (ACTOS) 45 MG tablet TAKE 1 TABLET DAILY 90 tablet 3    tirzepatide (MOUNJARO) 12.5 mg/0.5 mL PnIj Inject 12.5 mg into the skin every 7 days. 2 mL 0    [START ON 3/7/2025] tirzepatide 15 mg/0.5 mL PnIj Inject 15 mg into the skin every 7 days. 2 mL 11     No current facility-administered medications for this visit.     Social History     Socioeconomic History    Marital status:    Tobacco Use    Smoking status: Former    Smokeless tobacco: Never   Substance and Sexual Activity    Alcohol use: Never    Drug use: Never     Social Drivers of Health     Financial Resource Strain: Unknown (12/12/2022)    Received from [facility] Subsidiaries and Affiliates, [facility] Subsidi[facility] and Aff[facility]ates    Overall Financial Resource Strain (CARDIA)     Difficulty of Paying Living Expenses: Patient declined   Food Insecurity: Unknown (12/12/2022)    Received from [facility] Its Subsidiaries and Affiliates, [facility] and Its Subsidiaries and Affiliates    Hunger Vital Sign     Worried About Running Out of Food in the Last Year: Patient declined     Ran Out of Food in the Last Year:  Patient declined   Transportation Needs: Unknown (12/12/2022)    [facility], [facility] and Its [facility]    PRAPARE - Transportation     Lack of Transportation (Medical): Patient declined     Lack of Transportation (Non-Medical): Patient declined   Stress: Unknown (12/12/2022)    Received from [facility] and Its [facility], [facility]    [facility] of Occupational Health - Occupational Stress Questionnaire     Feeling of Stress : Patient declined      Family History   Problem Relation Name Age of Onset    Diabetes Mother      Hypertension Mother      Diabetes Father      Colon cancer Father      Stroke Sister          Objective:      Physical Exam  Constitutional:       Appearance: She is well-developed. She is obese.   HENT:      Head: Normocephalic and atraumatic.   Eyes:      General: No scleral icterus.     Conjunctiva/sclera: Conjunctivae normal.      Pupils: Pupils are equal, round, and reactive to light.   Neck:      Thyroid: No thyroid mass.      Trachea: Trachea normal.   Cardiovascular:      Rate and Rhythm: Normal rate.   Pulmonary:      Effort: Pulmonary effort is normal.   Musculoskeletal:      Cervical back: Normal range of motion and neck supple.   Neurological:      Mental Status: She is alert and oriented to person, place, and time.   Psychiatric:         Mood and Affect: Mood normal.         Behavior: Behavior normal.         Assessment:     1. Type 2 diabetes mellitus with both eyes affected by mild nonproliferative retinopathy without macular edema, without long-term current use of insulin Active   2. Diabetic polyneuropathy associated with type 2 diabetes mellitus *Stable   3. Class 2 obesity due to excess calories  without serious comorbidity with body mass index (BMI) of 35.0 to 35.9 in adult Active   4. Mixed hyperlipidemia Sub-optimally controlled   5. Fatigue, unspecified type Active   6. Vitamin D deficiency    7. Anemia screen    8. Need for prophylactic vaccination with Streptococcus pneumoniae (Pneumococcus) and Influenza vaccines    9. Breast cancer screening by mammogram      Plan:       PROBLEM LIST     Type 2 diabetes mellitus with both eyes affected by mild nonproliferative retinopathy without macular edema, without long-term current use of insulin  Comments:  Recent diabetic eye exam at [facility] Jan 27 2025, new diagnosis of diabetic retinopathy. Ophthalmologist will continue to follow her closely.  Orders:  -     CBC Auto Differential  -     Comprehensive Metabolic Panel  -     Lipid Panel  -     TSH+Free T4  -     Hemoglobin A1C  -     tirzepatide (MOUNJARO) 12.5 mg/0.5 mL PnIj; Inject 12.5 mg into the skin every 7 days.  Dispense: 2 mL; Refill: 0  -     tirzepatide 15 mg/0.5 mL PnIj; Inject 15 mg into the skin every 7 days.  Dispense: 2 mL; Refill: 11  -     Microalbumin/Creatinine Ratio, urine    Diabetic polyneuropathy associated with type 2 diabetes mellitus  Comments:  Last A1c 7.8%; obtaining A1c today  Orders:  -     tirzepatide (MOUNJARO) 12.5 mg/0.5 mL PnIj; Inject 12.5 mg into the skin every 7 days.  Dispense: 2 mL; Refill: 0  -     tirzepatide 15 mg/0.5 mL PnIj; Inject 15 mg into the skin every 7 days.  Dispense: 2 mL; Refill: 11  -     Microalbumin/Creatinine Ratio, urine    Class 2 obesity due to excess calories without serious comorbidity with body mass index (BMI) of 35.0 to 35.9 in adult  Comments:  adhere to ADA diet; increasing mounjaro from 10 mg to 12.5 mg weekly; recommend walking 30 min daily    Mixed hyperlipidemia  Comments:  discussed the importance of statin with diabetes and and tried to alleviate her fears; she is agreeable to restart the medication.  Orders:  -      atorvastatin (LIPITOR) 40 MG tablet; Take 1 tablet (40 mg total) by mouth once daily.  Dispense: 90 tablet; Refill: 3    Fatigue, unspecified type  Comments:  suspect this is related to her stopping her thyroid medication; checking thyroid panel today.    Vitamin D deficiency  -     Vitamin D    Anemia screen  -     Iron and TIBC  -     Vitamin B12 & Folate    Need for prophylactic vaccination with Streptococcus pneumoniae (Pneumococcus) and Influenza vaccines  -     pneumoc 20-allyson conj-dip cr(PF) (PREVNAR-20 (PF)) injection Syrg 0.5 mL    Breast cancer screening by mammogram  -     Mammo Digital Screening Bilat; Future; Expected date: 02/07/2025

## 2025-02-10 ENCOUNTER — PATIENT OUTREACH (OUTPATIENT)
Dept: ADMINISTRATIVE | Facility: HOSPITAL | Age: 60
End: 2025-02-10
Payer: COMMERCIAL

## 2025-02-10 ENCOUNTER — PATIENT MESSAGE (OUTPATIENT)
Dept: FAMILY MEDICINE | Facility: CLINIC | Age: 60
End: 2025-02-10
Payer: COMMERCIAL

## 2025-02-17 ENCOUNTER — RESULTS FOLLOW-UP (OUTPATIENT)
Dept: FAMILY MEDICINE | Facility: CLINIC | Age: 60
End: 2025-02-17
Payer: COMMERCIAL

## 2025-02-17 ENCOUNTER — PATIENT MESSAGE (OUTPATIENT)
Dept: FAMILY MEDICINE | Facility: CLINIC | Age: 60
End: 2025-02-17
Payer: COMMERCIAL

## 2025-02-17 DIAGNOSIS — E55.9 VITAMIN D DEFICIENCY: Primary | ICD-10-CM

## 2025-02-17 RX ORDER — ERGOCALCIFEROL 1.25 MG/1
50000 CAPSULE ORAL
Qty: 12 CAPSULE | Refills: 3 | Status: SHIPPED | OUTPATIENT
Start: 2025-02-17

## 2025-02-18 ENCOUNTER — PATIENT MESSAGE (OUTPATIENT)
Dept: FAMILY MEDICINE | Facility: CLINIC | Age: 60
End: 2025-02-18
Payer: COMMERCIAL

## 2025-02-18 DIAGNOSIS — J32.9 SINUSITIS, UNSPECIFIED CHRONICITY, UNSPECIFIED LOCATION: Primary | ICD-10-CM

## 2025-02-18 RX ORDER — DOXYLAMINE SUCCINATE AND PHENYLEPHRINE HYDROCHLORIDE 10.5; 1 MG/1; MG/1
1 TABLET ORAL 2 TIMES DAILY
Qty: 10 TABLET | Refills: 0 | Status: SHIPPED | OUTPATIENT
Start: 2025-02-18

## 2025-04-08 ENCOUNTER — PATIENT MESSAGE (OUTPATIENT)
Dept: FAMILY MEDICINE | Facility: CLINIC | Age: 60
End: 2025-04-08
Payer: COMMERCIAL

## 2025-04-08 DIAGNOSIS — K29.60 REFLUX GASTRITIS: ICD-10-CM

## 2025-04-08 DIAGNOSIS — K21.9 GASTROESOPHAGEAL REFLUX DISEASE, UNSPECIFIED WHETHER ESOPHAGITIS PRESENT: Primary | ICD-10-CM

## 2025-04-08 RX ORDER — PANTOPRAZOLE SODIUM 20 MG/1
20 TABLET, DELAYED RELEASE ORAL DAILY
Qty: 90 TABLET | Refills: 0 | Status: SHIPPED | OUTPATIENT
Start: 2025-04-08

## 2025-05-06 RX ORDER — PIOGLITAZONE 45 MG/1
45 TABLET ORAL
Qty: 90 TABLET | Refills: 3 | Status: SHIPPED | OUTPATIENT
Start: 2025-05-06

## 2025-05-19 ENCOUNTER — PATIENT MESSAGE (OUTPATIENT)
Dept: FAMILY MEDICINE | Facility: CLINIC | Age: 60
End: 2025-05-19
Payer: COMMERCIAL

## 2025-07-01 ENCOUNTER — TELEPHONE (OUTPATIENT)
Dept: FAMILY MEDICINE | Facility: CLINIC | Age: 60
End: 2025-07-01
Payer: COMMERCIAL

## 2025-07-01 NOTE — TELEPHONE ENCOUNTER
Copied from CRM #[id number]. Topic: Escalation - Escalation To Clinic  >> Jul 1, 2025  2:30 PM Osiris wrote:  Pt calling for appt 1st available 7/11/2025 pt wants be seen sooner.  Pt feels sugar levels are off and she can be reached at [phone]

## 2025-07-02 ENCOUNTER — OFFICE VISIT (OUTPATIENT)
Dept: FAMILY MEDICINE | Facility: CLINIC | Age: 60
End: 2025-07-02
Payer: COMMERCIAL

## 2025-07-02 VITALS
BODY MASS INDEX: 36.41 KG/M2 | DIASTOLIC BLOOD PRESSURE: 62 MMHG | OXYGEN SATURATION: 98 % | SYSTOLIC BLOOD PRESSURE: 108 MMHG | RESPIRATION RATE: 16 BRPM | HEART RATE: 82 BPM | HEIGHT: 67 IN | WEIGHT: 232 LBS | TEMPERATURE: 99 F

## 2025-07-02 DIAGNOSIS — E11.3293 TYPE 2 DIABETES MELLITUS WITH BOTH EYES AFFECTED BY MILD NONPROLIFERATIVE RETINOPATHY WITHOUT MACULAR EDEMA, WITHOUT LONG-TERM CURRENT USE OF INSULIN: Chronic | ICD-10-CM

## 2025-07-02 DIAGNOSIS — E66.01 SEVERE OBESITY (BMI 35.0-39.9) WITH COMORBIDITY: Chronic | ICD-10-CM

## 2025-07-02 DIAGNOSIS — E11.42 DIABETIC POLYNEUROPATHY ASSOCIATED WITH TYPE 2 DIABETES MELLITUS: Primary | Chronic | ICD-10-CM

## 2025-07-02 PROBLEM — Z85.038 HISTORY OF COLON CANCER: Status: ACTIVE | Noted: 2025-07-02

## 2025-07-02 RX ORDER — BLOOD SUGAR DIAGNOSTIC
1 STRIP MISCELLANEOUS 2 TIMES DAILY
Qty: 200 STRIP | Refills: 1 | Status: SHIPPED | OUTPATIENT
Start: 2025-07-02

## 2025-07-02 RX ORDER — TIRZEPATIDE 12.5 MG/.5ML
12.5 INJECTION, SOLUTION SUBCUTANEOUS
Qty: 6 ML | Refills: 3 | Status: SHIPPED | OUTPATIENT
Start: 2025-07-02 | End: 2026-07-02

## 2025-07-02 RX ORDER — PRAZOSIN HYDROCHLORIDE 2 MG/1
2 CAPSULE ORAL DAILY
COMMUNITY
Start: 2025-06-28

## 2025-07-02 RX ORDER — BLOOD-GLUCOSE METER
KIT MISCELLANEOUS
Qty: 1 EACH | Refills: 0 | Status: SHIPPED | OUTPATIENT
Start: 2025-07-02 | End: 2026-07-02

## 2025-07-02 NOTE — PROGRESS NOTES
Subjective:       Patient ID: Margaret Pate is a 60 y.o. female.    Chief Complaint: Follow-up (Pt complains of glucose concerns. Reports she ran out of her 12 mg of tirzepatide so she took an a 15mg  yesterday that she had left over, felt like her blood sugar dropped. Reports she is feeling better now. )     DM2 follow-up  She has been compliant with her diabetic medications... glipizide, Mounjaro, and Actos. Her last A1c was 5.3% 2025, today her A1c is 5.6%... She completed diabetic eye exam 2025 at [facility]. Demonstrated evidence of bilateral diabetic retinopathy. She has been on Mounjaro 12.5 mg weekly and she has been tolerating the medication. She ran out of the 12.5 mg and started 15 mg yesterday. She c/o feeling confused and weak after taking the higher dose and she felt like her blood glucose may have dropped too low.         Review of Systems   Constitutional:  Positive for fatigue. Negative for fever.   Respiratory:  Negative for chest tightness and shortness of breath.    Cardiovascular:  Negative for chest pain and palpitations.   Gastrointestinal:  Negative for abdominal pain, nausea and vomiting.   Skin:  Negative for rash.   Neurological:  Negative for dizziness, light-headedness and headaches.           Past Medical History:  Past Medical History:   Diagnosis Date    DM (diabetes mellitus)     TYPE 2    Elevated blood-pressure reading without diagnosis of hypertension     Hypothyroidism     Obesity, Class I, BMI 30.0-34.9 (see actual BMI)     Perimenopausal disorder     Personal history of colon cancer     Personal history of colonic polyps       Past Surgical History:   Procedure Laterality Date     SECTION      x 2    COLONOSCOPY      LAPAROSCOPIC GASTRIC BANDING      RIGHT HEMICOLECTOMY  2022    1.7 cm colon adenocarcinoma, 0/47 LN. pT2 pN0 pG2-3 pR0.      Review of patient's allergies indicates:  No Known Allergies   Current Medications[1]  Social History[2]   Family  History   Problem Relation Name Age of Onset    Diabetes Mother      Hypertension Mother      Diabetes Father      Colon cancer Father      Stroke Sister          Objective:      Physical Exam  Constitutional:       Appearance: She is well-developed. She is obese.   HENT:      Head: Normocephalic and atraumatic.   Eyes:      General: No scleral icterus.     Conjunctiva/sclera: Conjunctivae normal.      Pupils: Pupils are equal, round, and reactive to light.   Neck:      Thyroid: No thyroid mass.      Trachea: Trachea normal.   Cardiovascular:      Rate and Rhythm: Normal rate.   Pulmonary:      Effort: Pulmonary effort is normal.   Musculoskeletal:      Cervical back: Normal range of motion and neck supple.   Skin:     General: Skin is warm and dry.   Neurological:      Mental Status: She is alert and oriented to person, place, and time.   Psychiatric:         Mood and Affect: Mood normal.         Behavior: Behavior normal.         Assessment:     1. Diabetic polyneuropathy associated with type 2 diabetes mellitus Stable   2. Type 2 diabetes mellitus with both eyes affected by mild nonproliferative retinopathy without macular edema, without long-term current use of insulin Stable   3. Severe obesity (BMI 35.0-39.9) with comorbidity Active     Plan:       PROBLEM LIST     Diabetic polyneuropathy associated with type 2 diabetes mellitus  Comments:  recent hypoglycemic event; stop Actos, continue Mounjaro and glipizide. Ordering her new glucose meter for home use. RTC 3 mo for repeat A1c check  Orders:  -     Hemoglobin A1C, POCT  -     CONTOUR NEXT GLUCOSE METER kit; Use as instructed  Dispense: 1 each; Refill: 0  -     CONTOUR NEXT TEST STRIPS Strp; 1 strip by Misc.(Non-Drug; Combo Route) route 2 (two) times a day.  Dispense: 200 strip; Refill: 1  -     tirzepatide (MOUNJARO) 12.5 mg/0.5 mL PnIj; Inject 12.5 mg into the skin every 7 days.  Dispense: 6 mL; Refill: 3    Type 2 diabetes mellitus with both eyes affected  by mild nonproliferative retinopathy without macular edema, without long-term current use of insulin  Comments:  recent cataract extraction Rt eye; upcoming Lt cataract extraction scheduled with her ophthalmologist    Severe obesity (BMI 35.0-39.9) with comorbidity  Comments:  adhere to ADA diet; start walking 30 min daily. Stop eating poptarts!!               [1]   Current Outpatient Medications   Medication Sig Dispense Refill    prazosin (MINIPRESS) 2 MG Cap Take 2 mg by mouth once daily.      ALPRAZolam (XANAX) 0.25 MG tablet Take 0.25 mg by mouth 2 (two) times daily as needed.      atorvastatin (LIPITOR) 40 MG tablet Take 1 tablet (40 mg total) by mouth once daily. 90 tablet 3    CONTOUR NEXT GLUCOSE METER kit Use as instructed 1 each 0    CONTOUR NEXT TEST STRIPS Strp 1 strip by Misc.(Non-Drug; Combo Route) route 2 (two) times a day. 200 strip 1    ergocalciferol (VITAMIN D2) 50,000 unit Cap Take 1 capsule (50,000 Units total) by mouth every 7 days. 12 capsule 3    glipiZIDE (GLUCOTROL) 10 MG tablet Take 1 tablet (10 mg total) by mouth 2 (two) times daily with meals. 180 tablet 1    ondansetron (ZOFRAN-ODT) 8 MG TbDL Take 1 tablet (8 mg total) by mouth every 8 (eight) hours as needed (Nausea). 2 tablet 0    pantoprazole (PROTONIX) 20 MG tablet Take 1 tablet (20 mg total) by mouth once daily. 90 tablet 0    sertraline (ZOLOFT) 100 MG tablet Take 100 mg by mouth once daily.      tirzepatide (MOUNJARO) 12.5 mg/0.5 mL PnIj Inject 12.5 mg into the skin every 7 days. 6 mL 3     No current facility-administered medications for this visit.   [2]   Social History  Socioeconomic History    Marital status:    Tobacco Use    Smoking status: Former    Smokeless tobacco: Never   Substance and Sexual Activity    Alcohol use: Never    Drug use: Never     Social Drivers of Health     Financial Resource Strain: Unknown (12/12/2022)    Received from [facility] and Its Subsidiaries  and Affiliates    Overall Financial Resource Strain (CARDIA)     Difficulty of Paying Living Expenses: Patient declined   Food Insecurity: Unknown (12/12/2022)    [facility] and Its Subsidiaries and Affiliates    Hunger Vital Sign     Worried About Running Out of Food in the Last Year: Patient declined     Ran Out of Food in the Last Year: Patient declined   Transportation Needs: Unknown (12/12/2022)    Received from [facility] and Its Subsidiaries and Affiliates    PRAPARE - Transportation     Lack of Transportation (Medical): Patient declined     Lack of Transportation (Non-Medical): Patient declined   Stress: Unknown (12/12/2022)    Received from [facility] and Its Subsidiaries and Affiliates    [employer] - Occupational Stress Questionnaire     Feeling of Stress : Patient declined

## 2025-07-03 LAB — HBA1C MFR BLD: 5.6 %

## 2025-07-10 ENCOUNTER — PATIENT MESSAGE (OUTPATIENT)
Dept: FAMILY MEDICINE | Facility: CLINIC | Age: 60
End: 2025-07-10
Payer: COMMERCIAL

## 2025-07-11 ENCOUNTER — PATIENT MESSAGE (OUTPATIENT)
Dept: FAMILY MEDICINE | Facility: CLINIC | Age: 60
End: 2025-07-11
Payer: COMMERCIAL

## 2025-07-15 DIAGNOSIS — E11.42 DIABETIC POLYNEUROPATHY ASSOCIATED WITH TYPE 2 DIABETES MELLITUS: Chronic | ICD-10-CM

## 2025-07-15 RX ORDER — BLOOD SUGAR DIAGNOSTIC
1 STRIP MISCELLANEOUS 2 TIMES DAILY
Qty: 200 STRIP | Refills: 1 | Status: SHIPPED | OUTPATIENT
Start: 2025-07-15

## 2025-07-15 RX ORDER — BLOOD-GLUCOSE METER
KIT MISCELLANEOUS
Qty: 1 EACH | Refills: 0 | Status: SHIPPED | OUTPATIENT
Start: 2025-07-15 | End: 2026-07-15

## 2025-07-31 ENCOUNTER — PATIENT MESSAGE (OUTPATIENT)
Dept: FAMILY MEDICINE | Facility: CLINIC | Age: 60
End: 2025-07-31
Payer: COMMERCIAL

## 2025-08-04 ENCOUNTER — PATIENT MESSAGE (OUTPATIENT)
Dept: FAMILY MEDICINE | Facility: CLINIC | Age: 60
End: 2025-08-04
Payer: COMMERCIAL

## 2025-08-04 ENCOUNTER — TELEPHONE (OUTPATIENT)
Dept: FAMILY MEDICINE | Facility: CLINIC | Age: 60
End: 2025-08-04
Payer: COMMERCIAL

## 2025-08-04 NOTE — TELEPHONE ENCOUNTER
Copied from CRM #[id number]. Topic: Escalation - Escalation To Clinic  >> Aug 4, 2025  9:33 AM Osiris wrote:  Pt  Andres calling about pt medication the tirzepatide (MOUNJARO) 12.5 mg/0.5 mL PnIj stating pt took medication and blood sugar dropped low and pt went in to seizure.  This has happened about month ago and she blacked out.  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  Pt can be reached at [phone]     Thanks,

## 2025-08-06 ENCOUNTER — OFFICE VISIT (OUTPATIENT)
Dept: FAMILY MEDICINE | Facility: CLINIC | Age: 60
End: 2025-08-06
Payer: COMMERCIAL

## 2025-08-06 ENCOUNTER — PATIENT MESSAGE (OUTPATIENT)
Dept: FAMILY MEDICINE | Facility: CLINIC | Age: 60
End: 2025-08-06

## 2025-08-06 DIAGNOSIS — E16.2 HYPOGLYCEMIA: Primary | ICD-10-CM

## 2025-08-06 DIAGNOSIS — E11.42 DIABETIC POLYNEUROPATHY ASSOCIATED WITH TYPE 2 DIABETES MELLITUS: Chronic | ICD-10-CM

## 2025-08-06 DIAGNOSIS — E11.3293 TYPE 2 DIABETES MELLITUS WITH BOTH EYES AFFECTED BY MILD NONPROLIFERATIVE RETINOPATHY WITHOUT MACULAR EDEMA, WITHOUT LONG-TERM CURRENT USE OF INSULIN: Chronic | ICD-10-CM

## 2025-08-06 DIAGNOSIS — K21.9 GASTROESOPHAGEAL REFLUX DISEASE, UNSPECIFIED WHETHER ESOPHAGITIS PRESENT: Chronic | ICD-10-CM

## 2025-08-06 RX ORDER — PANTOPRAZOLE SODIUM 20 MG/1
20 TABLET, DELAYED RELEASE ORAL DAILY
Qty: 90 TABLET | Refills: 3 | Status: SHIPPED | OUTPATIENT
Start: 2025-08-06

## 2025-08-06 RX ORDER — GLIPIZIDE 5 MG/1
5 TABLET, FILM COATED, EXTENDED RELEASE ORAL
COMMUNITY

## 2025-08-06 RX ORDER — TIRZEPATIDE 5 MG/.5ML
5 INJECTION, SOLUTION SUBCUTANEOUS
Qty: 2 ML | Refills: 11 | Status: SHIPPED | OUTPATIENT
Start: 2025-08-06

## 2025-08-06 NOTE — PROGRESS NOTES
Subjective:       Patient ID: Margaret Pate is a 60 y.o. female.    Chief Complaint: Diabetes    She's had some recent episodes at home were her blood glucose dropped too low... one evening it dropped to 59 and she was lethargic.  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 Currently on weekly Mounjaro and daily glipizide. Her A1c has risen as high as 11.3% in . Her A1c was 8.6% last year. After starting GLP1 weekly injections, her last A1c was 5.3% in 2025.       Review of Systems   Constitutional:  Positive for fatigue. Negative for fever.   Respiratory:  Negative for chest tightness and shortness of breath.    Cardiovascular:  Negative for chest pain and palpitations.   Gastrointestinal:  Negative for abdominal pain, nausea and vomiting.   Skin:  Negative for rash.   Neurological:  Negative for dizziness, light-headedness and headaches.           Past Medical History:  Past Medical History:   Diagnosis Date    DM (diabetes mellitus)     TYPE 2    Elevated blood-pressure reading without diagnosis of hypertension     Hypothyroidism     Obesity, Class I, BMI 30.0-34.9 (see actual BMI)     Perimenopausal disorder     Personal history of colon cancer     Personal history of colonic polyps       Past Surgical History:   Procedure Laterality Date     SECTION      x 2    COLONOSCOPY      LAPAROSCOPIC GASTRIC BANDING      RIGHT HEMICOLECTOMY  2022    1.7 cm colon adenocarcinoma, 0/47 LN. pT2 pN0 pG2-3 pR0.      Review of patient's allergies indicates:  No Known Allergies   Current Medications[1]  Social History[2]   Family History   Problem Relation Name Age of Onset    Diabetes Mother      Hypertension Mother      Diabetes Father      Colon cancer Father      Stroke Sister          Objective:      Physical Exam  Neurological:      Mental Status: She is alert and oriented to person, place, and time.   Psychiatric:         Mood and  Affect: Mood normal.         Behavior: Behavior normal.         Assessment:     1. Hypoglycemia episodes Acute   2. Diabetic polyneuropathy associated with type 2 diabetes mellitus Active   3. Type 2 diabetes mellitus with both eyes affected by mild nonproliferative retinopathy without macular edema, without long-term current use of insulin Active   4. Gastroesophageal reflux disease, unspecified whether esophagitis present Stable     Plan:       PROBLEM LIST     Hypoglycemia episodes  Comments:  see notes below    Diabetic polyneuropathy associated with type 2 diabetes mellitus  -     tirzepatide (MOUNJARO) 5 mg/0.5 mL PnIj; Inject 5 mg into the skin every 7 days.  Dispense: 2 mL; Refill: 11    Type 2 diabetes mellitus with both eyes affected by mild nonproliferative retinopathy without macular edema, without long-term current use of insulin  -     tirzepatide (MOUNJARO) 5 mg/0.5 mL PnIj; Inject 5 mg into the skin every 7 days.  Dispense: 2 mL; Refill: 11    Gastroesophageal reflux disease, unspecified whether esophagitis present  Comments:  stable with PPI; will renew  Orders:  -     pantoprazole (PROTONIX) 20 MG tablet; Take 1 tablet (20 mg total) by mouth once daily.  Dispense: 90 tablet; Refill: 3    Given recent hypoglycemia events, recommend reducing Mounjaro to 5 mg weekly. Also reducing glipizide to 5 mg daily; F/u in 2 weeks; keep glucose log      The patient location is: [place]  The chief complaint leading to consultation is: DM2, hypoglycemia    Visit type: audiovisual    Face to Face time with patient: 25    minutes of total time spent on the encounter, which includes face to face time and non-face to face time preparing to see the patient (eg, review of tests), Obtaining and/or reviewing separately obtained history, Documenting clinical information in the electronic or other health record, Independently interpreting results (not separately reported) and communicating results to the  patient/family/caregiver, or Care coordination (not separately reported).         Each patient to whom he or she provides medical services by telemedicine is:  (1) informed of the relationship between the physician and patient and the respective role of any other health care provider with respect to management of the patient; and (2) notified that he or she may decline to receive medical services by telemedicine and may withdraw from such care at any time.    Notes:                                [1]   Current Outpatient Medications   Medication Sig Dispense Refill    glipiZIDE 5 MG TR24 Take 5 mg by mouth daily with breakfast.      ALPRAZolam (XANAX) 0.25 MG tablet Take 0.25 mg by mouth 2 (two) times daily as needed.      atorvastatin (LIPITOR) 40 MG tablet Take 1 tablet (40 mg total) by mouth once daily. 90 tablet 3    CONTOUR NEXT GLUCOSE METER kit Use as instructed 1 each 0    CONTOUR NEXT TEST STRIPS Strp 1 strip by Misc.(Non-Drug; Combo Route) route 2 (two) times a day. 200 strip 1    ergocalciferol (VITAMIN D2) 50,000 unit Cap Take 1 capsule (50,000 Units total) by mouth every 7 days. 12 capsule 3    ondansetron (ZOFRAN-ODT) 8 MG TbDL Take 1 tablet (8 mg total) by mouth every 8 (eight) hours as needed (Nausea). 2 tablet 0    pantoprazole (PROTONIX) 20 MG tablet Take 1 tablet (20 mg total) by mouth once daily. 90 tablet 3    prazosin (MINIPRESS) 2 MG Cap Take 2 mg by mouth once daily.      sertraline (ZOLOFT) 100 MG tablet Take 100 mg by mouth once daily.      tirzepatide (MOUNJARO) 5 mg/0.5 mL PnIj Inject 5 mg into the skin every 7 days. 2 mL 11     No current facility-administered medications for this visit.   [2]   Social History  Socioeconomic History    Marital status:    Tobacco Use    Smoking status: Former    Smokeless tobacco: Never   Substance and Sexual Activity    Alcohol use: Never    Drug use: Never     Social Drivers of Health     Financial Resource Strain: Unknown (12/12/2022)     Received from [facility] and Its Subsidiaries and Affiliates    Overall Financial Resource Strain (CARDIA)     Difficulty of Paying Living Expenses: Patient declined   Food Insecurity: Unknown (12/12/2022)    Received from [facility] and Its Subsidiaries and Affiliates    Hunger Vital Sign     Worried About Running Out of Food in the Last Year: Patient declined     Ran Out of Food in the Last Year: Patient declined   Transportation Needs: Unknown (12/12/2022)    [facility]    PRAPARE - Transportation     Lack of Transportation (Medical): Patient declined     Lack of Transportation (Non-Medical): Patient declined   Stress: Unknown (12/12/2022)    [facility] and Its Subsidiaries and Affiliates    [employer] - Occupational Stress Questionnaire     Feeling of Stress : Patient declined

## 2025-08-07 VITALS — DIASTOLIC BLOOD PRESSURE: 62 MMHG | SYSTOLIC BLOOD PRESSURE: 108 MMHG

## 2025-08-11 DIAGNOSIS — E11.42 DIABETIC POLYNEUROPATHY ASSOCIATED WITH TYPE 2 DIABETES MELLITUS: Chronic | ICD-10-CM

## 2025-08-11 RX ORDER — INSULIN PUMP SYRINGE, 3 ML
EACH MISCELLANEOUS
Qty: 1 EACH | Refills: 0 | Status: SHIPPED | OUTPATIENT
Start: 2025-08-11

## 2025-08-27 RX ORDER — GLIPIZIDE 10 MG/1
10 TABLET ORAL 2 TIMES DAILY WITH MEALS
Qty: 180 TABLET | Refills: 3 | Status: SHIPPED | OUTPATIENT
Start: 2025-08-27

## 2025-09-03 ENCOUNTER — OFFICE VISIT (OUTPATIENT)
Dept: FAMILY MEDICINE | Facility: CLINIC | Age: 60
End: 2025-09-03
Payer: COMMERCIAL

## 2025-09-03 VITALS — SYSTOLIC BLOOD PRESSURE: 108 MMHG | DIASTOLIC BLOOD PRESSURE: 62 MMHG

## 2025-09-03 DIAGNOSIS — J01.00 ACUTE NON-RECURRENT MAXILLARY SINUSITIS: Primary | ICD-10-CM

## 2025-09-03 PROCEDURE — 2022F DILAT RTA XM EVC RTNOPTHY: CPT | Mod: CPTII,95,, | Performed by: NURSE PRACTITIONER

## 2025-09-03 PROCEDURE — 3074F SYST BP LT 130 MM HG: CPT | Mod: CPTII,95,, | Performed by: NURSE PRACTITIONER

## 2025-09-03 PROCEDURE — 98005 SYNCH AUDIO-VIDEO EST LOW 20: CPT | Mod: 95,,, | Performed by: NURSE PRACTITIONER

## 2025-09-03 PROCEDURE — 3044F HG A1C LEVEL LT 7.0%: CPT | Mod: CPTII,95,, | Performed by: NURSE PRACTITIONER

## 2025-09-03 PROCEDURE — 3078F DIAST BP <80 MM HG: CPT | Mod: CPTII,95,, | Performed by: NURSE PRACTITIONER

## 2025-09-03 RX ORDER — AMOXICILLIN AND CLAVULANATE POTASSIUM 875; 125 MG/1; MG/1
1 TABLET, FILM COATED ORAL EVERY 12 HOURS
Qty: 20 TABLET | Refills: 0 | Status: SHIPPED | OUTPATIENT
Start: 2025-09-03

## 2025-09-03 RX ORDER — BROMPHENIRAMINE MALEATE, PSEUDOEPHEDRINE HYDROCHLORIDE, AND DEXTROMETHORPHAN HYDROBROMIDE 2; 30; 10 MG/5ML; MG/5ML; MG/5ML
10 SYRUP ORAL EVERY 4 HOURS PRN
Qty: 240 ML | Refills: 0 | Status: SHIPPED | OUTPATIENT
Start: 2025-09-03 | End: 2025-09-13